# Patient Record
Sex: FEMALE | Race: BLACK OR AFRICAN AMERICAN | NOT HISPANIC OR LATINO | Employment: FULL TIME | URBAN - METROPOLITAN AREA
[De-identification: names, ages, dates, MRNs, and addresses within clinical notes are randomized per-mention and may not be internally consistent; named-entity substitution may affect disease eponyms.]

---

## 2021-06-07 PROCEDURE — 99283 EMERGENCY DEPT VISIT LOW MDM: CPT

## 2021-06-08 ENCOUNTER — APPOINTMENT (EMERGENCY)
Dept: CT IMAGING | Facility: HOSPITAL | Age: 36
End: 2021-06-08
Payer: COMMERCIAL

## 2021-06-08 ENCOUNTER — HOSPITAL ENCOUNTER (EMERGENCY)
Facility: HOSPITAL | Age: 36
Discharge: HOME | End: 2021-06-08
Attending: EMERGENCY MEDICINE
Payer: COMMERCIAL

## 2021-06-08 VITALS
TEMPERATURE: 97.7 F | BODY MASS INDEX: 24.16 KG/M2 | HEART RATE: 64 BPM | HEIGHT: 65 IN | DIASTOLIC BLOOD PRESSURE: 72 MMHG | SYSTOLIC BLOOD PRESSURE: 132 MMHG | OXYGEN SATURATION: 100 % | WEIGHT: 145 LBS | RESPIRATION RATE: 18 BRPM

## 2021-06-08 DIAGNOSIS — G44.319 ACUTE POST-TRAUMATIC HEADACHE, NOT INTRACTABLE: ICD-10-CM

## 2021-06-08 DIAGNOSIS — S09.90XA INJURY OF HEAD, INITIAL ENCOUNTER: Primary | ICD-10-CM

## 2021-06-08 PROCEDURE — 96372 THER/PROPH/DIAG INJ SC/IM: CPT

## 2021-06-08 PROCEDURE — 70450 CT HEAD/BRAIN W/O DYE: CPT

## 2021-06-08 PROCEDURE — 99283 EMERGENCY DEPT VISIT LOW MDM: CPT | Performed by: PHYSICIAN ASSISTANT

## 2021-06-08 PROCEDURE — G1004 CDSM NDSC: HCPCS

## 2021-06-08 RX ORDER — KETOROLAC TROMETHAMINE 30 MG/ML
15 INJECTION, SOLUTION INTRAMUSCULAR; INTRAVENOUS ONCE
Status: COMPLETED | OUTPATIENT
Start: 2021-06-08 | End: 2021-06-08

## 2021-06-08 RX ADMIN — KETOROLAC TROMETHAMINE 15 MG: 30 INJECTION, SOLUTION INTRAMUSCULAR at 02:19

## 2021-06-08 NOTE — ED PROVIDER NOTES
History  Chief Complaint   Patient presents with    Head Injury     Hilary Flynn in bathroom and hit head, no LOC, now complaining of headache     Patient is a 69-year-old female with no significant past medical history who presents to the emergency department for evaluation of a headache status post a fall that happened about 3 hours ago  Patient states that she walked into her bathroom, the floor was wet  The patient slipped and fell on the floor  She did hit the back of her head on the laminate floor  Patient states that she immediately developed a headache  Patient states that the headache is about an 8/10 severity  She did not lose consciousness  Patient is reporting dizziness  Patient states that she is not having any blurred vision, weakness, numbness, chest pain, difficulty breathing, abdominal pain, nausea, vomiting, diarrhea  Patient is not having any other symptoms at this time  None       History reviewed  No pertinent past medical history  History reviewed  No pertinent surgical history  History reviewed  No pertinent family history  I have reviewed and agree with the history as documented  E-Cigarette/Vaping     E-Cigarette/Vaping Substances     Social History     Tobacco Use    Smoking status: Never Smoker    Smokeless tobacco: Never Used   Substance Use Topics    Alcohol use: Not Currently    Drug use: Not Currently       Review of Systems   Constitutional: Negative for chills, diaphoresis and fever  HENT: Negative for congestion, facial swelling, nosebleeds, sore throat and voice change  Eyes: Negative for pain and redness  Respiratory: Negative for cough, choking, chest tightness, shortness of breath and stridor  Cardiovascular: Negative for chest pain and palpitations  Gastrointestinal: Negative for abdominal pain, diarrhea, nausea and vomiting     Genitourinary: Negative for difficulty urinating, dysuria, flank pain, hematuria, vaginal bleeding and vaginal discharge  Musculoskeletal: Negative for arthralgias, back pain, myalgias, neck pain and neck stiffness  Skin: Negative for color change and rash  Neurological: Positive for dizziness and headaches  Negative for syncope, facial asymmetry, weakness, light-headedness and numbness  Psychiatric/Behavioral: Negative for confusion and suicidal ideas  All other systems reviewed and are negative  Physical Exam  Physical Exam  Vitals signs reviewed  Constitutional:       General: She is not in acute distress  Appearance: Normal appearance  She is not ill-appearing, toxic-appearing or diaphoretic  HENT:      Head: Normocephalic and atraumatic  Right Ear: External ear normal       Left Ear: External ear normal       Nose: Nose normal  No congestion or rhinorrhea  Mouth/Throat:      Mouth: Mucous membranes are moist       Pharynx: Oropharynx is clear  No oropharyngeal exudate or posterior oropharyngeal erythema  Eyes:      General: No visual field deficit or scleral icterus  Right eye: No discharge  Left eye: No discharge  Extraocular Movements: Extraocular movements intact  Conjunctiva/sclera: Conjunctivae normal       Pupils: Pupils are equal, round, and reactive to light  Neck:      Musculoskeletal: Normal range of motion and neck supple  Cardiovascular:      Rate and Rhythm: Normal rate and regular rhythm  Pulses: Normal pulses  Heart sounds: Normal heart sounds  No murmur  No friction rub  No gallop  Pulmonary:      Effort: Pulmonary effort is normal  No respiratory distress  Breath sounds: Normal breath sounds  No stridor  No wheezing, rhonchi or rales  Abdominal:      General: Abdomen is flat  Palpations: Abdomen is soft  Tenderness: There is no abdominal tenderness  There is no guarding or rebound  Musculoskeletal:      Right lower leg: No edema  Left lower leg: No edema  Skin:     General: Skin is warm and dry  Capillary Refill: Capillary refill takes less than 2 seconds  Neurological:      General: No focal deficit present  Mental Status: She is alert and oriented to person, place, and time  GCS: GCS eye subscore is 4  GCS verbal subscore is 5  GCS motor subscore is 6  Cranial Nerves: Cranial nerves are intact  No cranial nerve deficit, dysarthria or facial asymmetry  Sensory: Sensation is intact  No sensory deficit  Motor: Motor function is intact  No weakness, tremor, seizure activity or pronator drift  Coordination: Coordination is intact  Romberg sign negative  Coordination normal  Finger-Nose-Finger Test and Heel to [facility] Test normal  Rapid alternating movements normal       Gait: Gait is intact  Gait normal    Psychiatric:         Mood and Affect: Mood normal          Behavior: Behavior normal          Vital Signs  ED Triage Vitals [06/07/21 2352]   Temperature Pulse Respirations Blood Pressure SpO2   97 7 °F (36 5 °C) 64 18 132/72 100 %      Temp Source Heart Rate Source Patient Position - Orthostatic VS BP Location FiO2 (%)   Temporal Monitor Sitting Left arm --      Pain Score       8           Vitals:    06/07/21 2352   BP: 132/72   Pulse: 64   Patient Position - Orthostatic VS: Sitting         Visual Acuity      ED Medications  Medications   ketorolac (TORADOL) injection 15 mg (15 mg Intramuscular Given 6/8/21 0219)       Diagnostic Studies  Results Reviewed     None                 CT head without contrast   Final Result by Estephania Najera MD (06/08 0129)      No acute intracranial abnormality  Workstation performed: [id number]                    Procedures  Procedures         ED Course                             SBIRT 22yo+      Most Recent Value   SBIRT (22 yo +)   In order to provide better care to our patients, we are screening all of our patients for alcohol and drug use  Would it be okay to ask you these screening questions?   Yes Filed at: 06/08/2021 0121 Initial Alcohol Screen: US AUDIT-C    1  How often do you have a drink containing alcohol?  0 Filed at: 06/08/2021 0121   2  How many drinks containing alcohol do you have on a typical day you are drinking? 0 Filed at: 06/08/2021 0121   3b  FEMALE Any Age, or MALE 65+: How often do you have 4 or more drinks on one occassion? 0 Filed at: 06/08/2021 0121   Audit-C Score  0 Filed at: 06/08/2021 0121   JOCELYN: How many times in the past year have you    Used an illegal drug or used a prescription medication for non-medical reasons? Never Filed at: 06/08/2021 0121                    MDM  Number of Diagnoses or Management Options  Acute post-traumatic headache, not intractable:   Injury of head, initial encounter:   Diagnosis management comments: Patient is a 25-year-old female with no significant past medical history who presents to the emergency department for evaluation of a headache status post a fall that happened about 3 hours ago  Patient states that she walked into her bathroom, the floor was wet  The patient slipped and fell on the floor  She did hit the back of her head on the laminate floor  Patient states that she immediately developed a headache  Patient states that the headache is about an 8/10 severity  She did not lose consciousness  Patient is reporting dizziness  Patient states that she is not having any blurred vision, weakness, numbness, chest pain, difficulty breathing, abdominal pain, nausea, vomiting, diarrhea  Patient is not having any other symptoms at this time  Patient appears comfortable in bed, she is not any acute distress  Her vital signs are normal   There was no hematoma or swelling noted to patient's scalp  Patient's neurologic exam was benign  Noncontrast CT of the head did not reveal any acute intracranial abnormalities  Patient was given a dose of Toradol in the emergency department    Patient was discharged home with instructions to follow-up with her primary care provider  Patient was given very strict instructions on when she should return to the emergency department  Amount and/or Complexity of Data Reviewed  Tests in the radiology section of CPT®: ordered and reviewed    Patient Progress  Patient progress: stable      Disposition  Final diagnoses:   Injury of head, initial encounter   Acute post-traumatic headache, not intractable     Time reflects when diagnosis was documented in both MDM as applicable and the Disposition within this note     Time User Action Codes Description Comment    6/8/2021  2:00 AM Colan Landau Add [S06 0X0A] Concussion without loss of consciousness, initial encounter     6/8/2021  2:00 AM Colan Landau Remove [S06 0X0A] Concussion without loss of consciousness, initial encounter     6/8/2021  2:00 AM Colan Landau Add [S09 90XA] Injury of head, initial encounter     6/8/2021  2:00 AM Colan Landau Add [A85 232] Acute post-traumatic headache, not intractable       ED Disposition     ED Disposition Condition Date/Time Comment    Discharge Stable Tue Jun 8, 2021  2:00 AM Ayde Raya discharge to home/self care  Follow-up Information     Follow up With Specialties Details Why Contact Info Additional [address] [facility] Emergency Department Emergency Medicine Go to  If symptoms worsen [facility] Emergency Department, [address] [place] [address], [facility] Jorge Segundo MD Pediatrics Schedule an appointment as soon as possible for a visit  for follow up [place] [address]  [address]  [address] [facility] [address] [phone]             There are no discharge medications for this patient  No discharge procedures on file      PDMP Review     None          ED Provider  Electronically Signed by           Varun Barraza PA-C  06/08/21 4115 Advancement-Rotation Flap Text: The defect edges were debeveled with a #15 scalpel blade.  Given the location of the defect, shape of the defect and the proximity to free margins an advancement-rotation flap was deemed most appropriate.  Using a sterile surgical marker, an appropriate flap was drawn incorporating the defect and placing the expected incisions within the relaxed skin tension lines where possible. The area thus outlined was incised deep to adipose tissue with a #15 scalpel blade.  The skin margins were undermined to an appropriate distance in all directions utilizing iris scissors.

## 2022-05-31 ENCOUNTER — APPOINTMENT (EMERGENCY)
Dept: CT IMAGING | Facility: HOSPITAL | Age: 37
End: 2022-05-31
Payer: COMMERCIAL

## 2022-05-31 ENCOUNTER — HOSPITAL ENCOUNTER (OUTPATIENT)
Facility: HOSPITAL | Age: 37
Setting detail: OBSERVATION
Discharge: HOME | End: 2022-06-01
Attending: EMERGENCY MEDICINE | Admitting: INTERNAL MEDICINE
Payer: COMMERCIAL

## 2022-05-31 DIAGNOSIS — H47.10 PAPILLEDEMA: Primary | ICD-10-CM

## 2022-05-31 PROBLEM — F41.9 ANXIETY: Status: ACTIVE | Noted: 2022-05-31

## 2022-05-31 PROBLEM — F32.A DEPRESSION: Status: ACTIVE | Noted: 2022-05-31

## 2022-05-31 LAB
ALBUMIN SERPL BCP-MCNC: 3.7 G/DL (ref 3.5–5)
ALP SERPL-CCNC: 98 U/L (ref 46–116)
ALT SERPL W P-5'-P-CCNC: 28 U/L (ref 12–78)
ANION GAP SERPL CALCULATED.3IONS-SCNC: 7 MMOL/L (ref 4–13)
APTT PPP: 32 SECONDS (ref 23–37)
AST SERPL W P-5'-P-CCNC: 22 U/L (ref 5–45)
BASOPHILS # BLD AUTO: 0.03 THOUSANDS/ΜL (ref 0–0.1)
BASOPHILS NFR BLD AUTO: 1 % (ref 0–1)
BILIRUB SERPL-MCNC: 0.24 MG/DL (ref 0.2–1)
BUN SERPL-MCNC: 15 MG/DL (ref 5–25)
CALCIUM SERPL-MCNC: 8.7 MG/DL (ref 8.3–10.1)
CHLORIDE SERPL-SCNC: 103 MMOL/L (ref 100–108)
CO2 SERPL-SCNC: 27 MMOL/L (ref 21–32)
CREAT SERPL-MCNC: 0.82 MG/DL (ref 0.6–1.3)
EOSINOPHIL # BLD AUTO: 0.12 THOUSAND/ΜL (ref 0–0.61)
EOSINOPHIL NFR BLD AUTO: 3 % (ref 0–6)
ERYTHROCYTE [DISTWIDTH] IN BLOOD BY AUTOMATED COUNT: 14.5 % (ref 11.6–15.1)
EXT PREG TEST URINE: NEGATIVE
EXT. CONTROL ED NAV: NORMAL
GFR SERPL CREATININE-BSD FRML MDRD: 91 ML/MIN/1.73SQ M
GLUCOSE SERPL-MCNC: 87 MG/DL (ref 65–140)
HCG SERPL QL: NEGATIVE
HCT VFR BLD AUTO: 35.6 % (ref 34.8–46.1)
HGB BLD-MCNC: 10.9 G/DL (ref 11.5–15.4)
IMM GRANULOCYTES # BLD AUTO: 0.01 THOUSAND/UL (ref 0–0.2)
IMM GRANULOCYTES NFR BLD AUTO: 0 % (ref 0–2)
INR PPP: 1.13 (ref 0.84–1.19)
LYMPHOCYTES # BLD AUTO: 1.8 THOUSANDS/ΜL (ref 0.6–4.47)
LYMPHOCYTES NFR BLD AUTO: 43 % (ref 14–44)
MCH RBC QN AUTO: 28 PG (ref 26.8–34.3)
MCHC RBC AUTO-ENTMCNC: 30.6 G/DL (ref 31.4–37.4)
MCV RBC AUTO: 92 FL (ref 82–98)
MONOCYTES # BLD AUTO: 0.35 THOUSAND/ΜL (ref 0.17–1.22)
MONOCYTES NFR BLD AUTO: 8 % (ref 4–12)
NEUTROPHILS # BLD AUTO: 1.84 THOUSANDS/ΜL (ref 1.85–7.62)
NEUTS SEG NFR BLD AUTO: 45 % (ref 43–75)
NRBC BLD AUTO-RTO: 0 /100 WBCS
PLATELET # BLD AUTO: 296 THOUSANDS/UL (ref 149–390)
PMV BLD AUTO: 11.1 FL (ref 8.9–12.7)
POTASSIUM SERPL-SCNC: 3.6 MMOL/L (ref 3.5–5.3)
PROT SERPL-MCNC: 7.6 G/DL (ref 6.4–8.2)
PROTHROMBIN TIME: 14 SECONDS (ref 11.6–14.5)
RBC # BLD AUTO: 3.89 MILLION/UL (ref 3.81–5.12)
SODIUM SERPL-SCNC: 137 MMOL/L (ref 136–145)
WBC # BLD AUTO: 4.15 THOUSAND/UL (ref 4.31–10.16)

## 2022-05-31 PROCEDURE — 99220 PR INITIAL OBSERVATION CARE/DAY 70 MINUTES: CPT | Performed by: INTERNAL MEDICINE

## 2022-05-31 PROCEDURE — 85730 THROMBOPLASTIN TIME PARTIAL: CPT | Performed by: PHYSICIAN ASSISTANT

## 2022-05-31 PROCEDURE — 85610 PROTHROMBIN TIME: CPT | Performed by: PHYSICIAN ASSISTANT

## 2022-05-31 PROCEDURE — 36415 COLL VENOUS BLD VENIPUNCTURE: CPT | Performed by: PHYSICIAN ASSISTANT

## 2022-05-31 PROCEDURE — 81025 URINE PREGNANCY TEST: CPT | Performed by: PHYSICIAN ASSISTANT

## 2022-05-31 PROCEDURE — 80053 COMPREHEN METABOLIC PANEL: CPT | Performed by: PHYSICIAN ASSISTANT

## 2022-05-31 PROCEDURE — 85025 COMPLETE CBC W/AUTO DIFF WBC: CPT | Performed by: PHYSICIAN ASSISTANT

## 2022-05-31 PROCEDURE — 84703 CHORIONIC GONADOTROPIN ASSAY: CPT | Performed by: PHYSICIAN ASSISTANT

## 2022-05-31 PROCEDURE — 99284 EMERGENCY DEPT VISIT MOD MDM: CPT

## 2022-05-31 PROCEDURE — 70450 CT HEAD/BRAIN W/O DYE: CPT

## 2022-05-31 PROCEDURE — 99285 EMERGENCY DEPT VISIT HI MDM: CPT | Performed by: PHYSICIAN ASSISTANT

## 2022-05-31 RX ORDER — LORAZEPAM 2 MG/ML
1 INJECTION INTRAMUSCULAR ONCE AS NEEDED
Status: DISCONTINUED | OUTPATIENT
Start: 2022-05-31 | End: 2022-06-01 | Stop reason: HOSPADM

## 2022-05-31 RX ORDER — ACETAMINOPHEN 325 MG/1
650 TABLET ORAL EVERY 6 HOURS PRN
Status: DISCONTINUED | OUTPATIENT
Start: 2022-05-31 | End: 2022-06-01 | Stop reason: HOSPADM

## 2022-06-01 ENCOUNTER — APPOINTMENT (OUTPATIENT)
Dept: NON INVASIVE DIAGNOSTICS | Facility: HOSPITAL | Age: 37
End: 2022-06-01
Attending: INTERNAL MEDICINE
Payer: COMMERCIAL

## 2022-06-01 VITALS
WEIGHT: 200 LBS | SYSTOLIC BLOOD PRESSURE: 140 MMHG | RESPIRATION RATE: 22 BRPM | HEART RATE: 68 BPM | TEMPERATURE: 98.9 F | DIASTOLIC BLOOD PRESSURE: 76 MMHG | OXYGEN SATURATION: 100 % | BODY MASS INDEX: 29.62 KG/M2 | HEIGHT: 69 IN

## 2022-06-01 LAB
ALBUMIN SERPL BCP-MCNC: 3.2 G/DL (ref 3.5–5)
ALP SERPL-CCNC: 86 U/L (ref 46–116)
ALT SERPL W P-5'-P-CCNC: 26 U/L (ref 12–78)
ANION GAP SERPL CALCULATED.3IONS-SCNC: 8 MMOL/L (ref 4–13)
APPEARANCE CSF: CLEAR
AST SERPL W P-5'-P-CCNC: 21 U/L (ref 5–45)
BILIRUB SERPL-MCNC: 0.28 MG/DL (ref 0.2–1)
BUN SERPL-MCNC: 12 MG/DL (ref 5–25)
CALCIUM ALBUM COR SERPL-MCNC: 9 MG/DL (ref 8.3–10.1)
CALCIUM SERPL-MCNC: 8.4 MG/DL (ref 8.3–10.1)
CHLORIDE SERPL-SCNC: 105 MMOL/L (ref 100–108)
CO2 SERPL-SCNC: 25 MMOL/L (ref 21–32)
CREAT SERPL-MCNC: 0.76 MG/DL (ref 0.6–1.3)
ERYTHROCYTE [DISTWIDTH] IN BLOOD BY AUTOMATED COUNT: 14.2 % (ref 11.6–15.1)
GFR SERPL CREATININE-BSD FRML MDRD: 99 ML/MIN/1.73SQ M
GLUCOSE CSF-MCNC: 62 MG/DL (ref 50–80)
GLUCOSE P FAST SERPL-MCNC: 88 MG/DL (ref 65–99)
GLUCOSE SERPL-MCNC: 88 MG/DL (ref 65–140)
GRAM STN SPEC: NORMAL
HCT VFR BLD AUTO: 34.1 % (ref 34.8–46.1)
HGB BLD-MCNC: 10.6 G/DL (ref 11.5–15.4)
MCH RBC QN AUTO: 27.9 PG (ref 26.8–34.3)
MCHC RBC AUTO-ENTMCNC: 31.1 G/DL (ref 31.4–37.4)
MCV RBC AUTO: 90 FL (ref 82–98)
PLATELET # BLD AUTO: 269 THOUSANDS/UL (ref 149–390)
PMV BLD AUTO: 10.9 FL (ref 8.9–12.7)
POTASSIUM SERPL-SCNC: 3.3 MMOL/L (ref 3.5–5.3)
PROT CSF-MCNC: 37 MG/DL (ref 15–45)
PROT SERPL-MCNC: 6.9 G/DL (ref 6.4–8.2)
RBC # BLD AUTO: 3.8 MILLION/UL (ref 3.81–5.12)
RBC # CSF MANUAL: 0 UL (ref 0–10)
SODIUM SERPL-SCNC: 138 MMOL/L (ref 136–145)
TOTAL CELLS COUNTED BLD: NO
WBC # BLD AUTO: 4.02 THOUSAND/UL (ref 4.31–10.16)
WBC # CSF AUTO: 0 /UL (ref 0–5)

## 2022-06-01 PROCEDURE — 62328 DX LMBR SPI PNXR W/FLUOR/CT: CPT | Performed by: RADIOLOGY

## 2022-06-01 PROCEDURE — 82945 GLUCOSE OTHER FLUID: CPT | Performed by: INTERNAL MEDICINE

## 2022-06-01 PROCEDURE — 62328 DX LMBR SPI PNXR W/FLUOR/CT: CPT

## 2022-06-01 PROCEDURE — 85027 COMPLETE CBC AUTOMATED: CPT | Performed by: INTERNAL MEDICINE

## 2022-06-01 PROCEDURE — 36415 COLL VENOUS BLD VENIPUNCTURE: CPT | Performed by: INTERNAL MEDICINE

## 2022-06-01 PROCEDURE — 99217 PR OBSERVATION CARE DISCHARGE MANAGEMENT: CPT | Performed by: INTERNAL MEDICINE

## 2022-06-01 PROCEDURE — 89051 BODY FLUID CELL COUNT: CPT | Performed by: INTERNAL MEDICINE

## 2022-06-01 PROCEDURE — 84157 ASSAY OF PROTEIN OTHER: CPT | Performed by: INTERNAL MEDICINE

## 2022-06-01 PROCEDURE — 89050 BODY FLUID CELL COUNT: CPT | Performed by: INTERNAL MEDICINE

## 2022-06-01 PROCEDURE — 80053 COMPREHEN METABOLIC PANEL: CPT | Performed by: INTERNAL MEDICINE

## 2022-06-01 PROCEDURE — 99283 EMERGENCY DEPT VISIT LOW MDM: CPT | Performed by: PSYCHIATRY & NEUROLOGY

## 2022-06-01 PROCEDURE — 87070 CULTURE OTHR SPECIMN AEROBIC: CPT | Performed by: INTERNAL MEDICINE

## 2022-06-01 RX ORDER — SODIUM CHLORIDE 9 MG/ML
125 INJECTION, SOLUTION INTRAVENOUS CONTINUOUS
Status: DISCONTINUED | OUTPATIENT
Start: 2022-06-01 | End: 2022-06-01 | Stop reason: HOSPADM

## 2022-06-01 RX ORDER — ACETAZOLAMIDE 250 MG/1
250 TABLET ORAL EVERY 12 HOURS SCHEDULED
Status: DISCONTINUED | OUTPATIENT
Start: 2022-06-01 | End: 2022-06-01 | Stop reason: HOSPADM

## 2022-06-01 RX ORDER — LIDOCAINE WITH 8.4% SOD BICARB 0.9%(10ML)
SYRINGE (ML) INJECTION CODE/TRAUMA/SEDATION MEDICATION
Status: COMPLETED | OUTPATIENT
Start: 2022-06-01 | End: 2022-06-01

## 2022-06-01 RX ORDER — POTASSIUM CHLORIDE 20 MEQ/1
20 TABLET, EXTENDED RELEASE ORAL ONCE
Status: COMPLETED | OUTPATIENT
Start: 2022-06-01 | End: 2022-06-01

## 2022-06-01 RX ORDER — ACETAZOLAMIDE 250 MG/1
TABLET ORAL
Qty: 114 TABLET | Refills: 0 | Status: SHIPPED | OUTPATIENT
Start: 2022-06-01 | End: 2022-07-01

## 2022-06-01 RX ADMIN — POTASSIUM CHLORIDE 20 MEQ: 1500 TABLET, EXTENDED RELEASE ORAL at 09:16

## 2022-06-01 RX ADMIN — ACETAMINOPHEN 650 MG: 325 TABLET, FILM COATED ORAL at 10:05

## 2022-06-01 RX ADMIN — Medication 4 ML: at 12:20

## 2022-06-01 RX ADMIN — SODIUM CHLORIDE 125 ML/HR: 0.9 INJECTION, SOLUTION INTRAVENOUS at 11:39

## 2022-06-01 RX ADMIN — ACETAZOLAMIDE 250 MG: 250 TABLET ORAL at 16:19

## 2022-06-01 RX ADMIN — SERTRALINE HYDROCHLORIDE 50 MG: 50 TABLET ORAL at 09:15

## 2022-06-01 NOTE — ED NOTES
Pt provided crackers and juice, no distress noted at this time        Wily Dawson, ANA  06/01/22 6766

## 2022-06-01 NOTE — PLAN OF CARE
Problem: PAIN - ADULT  Goal: Verbalizes/displays adequate comfort level or baseline comfort level  Description: Interventions:  - Encourage patient to monitor pain and request assistance  - Assess pain using appropriate pain scale  - Administer analgesics based on type and severity of pain and evaluate response  - Implement non-pharmacological measures as appropriate and evaluate response  - Consider cultural and social influences on pain and pain management  - Notify physician/advanced practitioner if interventions unsuccessful or patient reports new pain  Outcome: Progressing     Problem: INFECTION - ADULT  Goal: Absence or prevention of progression during hospitalization  Description: INTERVENTIONS:  - Assess and monitor for signs and symptoms of infection  - Monitor lab/diagnostic results  - Monitor all insertion sites, i e  indwelling lines, tubes, and drains  - Monitor endotracheal if appropriate and nasal secretions for changes in amount and color  - [place] appropriate cooling/warming therapies per order  - Administer medications as ordered  - Instruct and encourage patient and family to use good hand hygiene technique  - Identify and instruct in appropriate isolation precautions for identified infection/condition  Outcome: Progressing  Goal: Absence of fever/infection during neutropenic period  Description: INTERVENTIONS:  - Monitor WBC    Outcome: Progressing     Problem: SAFETY ADULT  Goal: Patient will remain free of falls  Description: INTERVENTIONS:  - Educate patient/family on patient safety including physical limitations  - Instruct patient to call for assistance with activity   - Consult OT/PT to assist with strengthening/mobility   - Keep Call bell within reach  - Keep bed low and locked with side rails adjusted as appropriate  - Keep care items and personal belongings within reach  - Initiate and maintain comfort rounds  - Make Fall Risk Sign visible to staff  - Offer Toileting every 2 Hours, in advance of need  - Initiate/Maintain bed alarm  - Obtain necessary fall risk management equipment:   - Apply yellow socks and bracelet for high fall risk patients  - Consider moving patient to room near nurses station  Outcome: Progressing  Goal: Maintain or return to baseline ADL function  Description: INTERVENTIONS:  -  Assess patient's ability to carry out ADLs; assess patient's baseline for ADL function and identify physical deficits which impact ability to perform ADLs (bathing, care of mouth/teeth, toileting, grooming, dressing, etc )  - Assess/evaluate cause of self-care deficits   - Assess range of motion  - Assess patient's mobility; develop plan if impaired  - Assess patient's need for assistive devices and provide as appropriate  - Encourage maximum independence but intervene and supervise when necessary  - Involve family in performance of ADLs  - Assess for home care needs following discharge   - Consider OT consult to assist with ADL evaluation and planning for discharge  - Provide patient education as appropriate  Outcome: Progressing  Goal: Maintains/Returns to pre admission functional level  Description: INTERVENTIONS:  - Perform BMAT or MOVE assessment daily    - Set and communicate daily mobility goal to care team and patient/family/caregiver  - Collaborate with rehabilitation services on mobility goals if consulted  - Perform Range of Motion 2 times a day  - Reposition patient every 2 hours    - Dangle patient 2 times a day  - Stand patient 2 times a day  - Ambulate patient 2 times a day  - Out of bed to chair 2 times a day   - Out of bed for meals 2 times a day  - Out of bed for toileting  - Record patient progress and toleration of activity level   Outcome: Progressing     Problem: DISCHARGE PLANNING  Goal: Discharge to home or other facility with appropriate resources  Description: INTERVENTIONS:  - Identify barriers to discharge w/patient and caregiver  - Arrange for needed discharge resources and transportation as appropriate  - Identify discharge learning needs (meds, wound care, etc )  - Arrange for interpretive services to assist at discharge as needed  - Refer to Case Management Department for coordinating discharge planning if the patient needs post-hospital services based on physician/advanced practitioner order or complex needs related to functional status, cognitive ability, or social support system  Outcome: Progressing     Problem: Knowledge Deficit  Goal: Patient/family/caregiver demonstrates understanding of disease process, treatment plan, medications, and discharge instructions  Description: Complete learning assessment and assess knowledge base    Interventions:  - Provide teaching at level of understanding  - Provide teaching via preferred learning methods  Outcome: Progressing

## 2022-06-01 NOTE — NURSING NOTE
Patient resting in bed with no needs or complaints  Patient provided with a snack per request  Vital signs WNL  Call Karissa Sic in reach

## 2022-06-01 NOTE — ASSESSMENT & PLAN NOTE
Patient reports the ED after an ophthalmologist appointment today  Patient reports that she has been having blurred vision and intermittent headaches over the last 2 weeks  During her ophthalmology appointment there was found to be bilateral papilledema on examination and she was referred to the ED for further workup  · No other neuro deficits such as dizziness, slurred speech, facial droop, weakness, numbness/tingling, ataxia    · CT head:  No acute intracranial abnormality  · Obtain MRI  · Consult IR for LP in the morning to evaluate for idiopathic intracranial hypertension  · Neuro checks  · Consult to Neurology, recommendations are appreciated

## 2022-06-01 NOTE — BRIEF OP NOTE (RAD/CATH)
INTERVENTIONAL RADIOLOGY PROCEDURE NOTE    Date: 6/1/2022    Procedure: lumbar puncture    Preoperative diagnosis:   1  Papilledema         Postoperative diagnosis: Same  Surgeon: Don Espinoza DO     Assistant: None  No qualified resident was available  Blood loss: minimal    Specimens: 28 cc clear csf     Findings: Opening pressure 29; closing pressure 12    Complications: None immediate      Anesthesia: local

## 2022-06-01 NOTE — ASSESSMENT & PLAN NOTE
37F with no significant medical history who was referred to the ED 5/31/2022 by her ophthalmologist secondary to exam findings concerning for papilledema, increased occular pressures and inflammed optic nerve  The patient reports that she has been experiencing  intermittent headaches since having Covid-19 12/2021  She describes her headaches as variable; sometimes bilateral temporal area but more often sinus pressure  She tried prn tylenol and motrin with some relief  She states that in addition to her headaches, she felt dizzy when quickly moving to an upright position, blurred vision , photophobia, and was seeing colored spots x 2 weeks  She also reported intermittent numbness and tingling in her bilateral fingers which is not new  Initial /86  LP 6/1/2022 with OP 29, CP 12 consistent with idiopathic intracranial hypertension    Imaging:  [facility] 5/31:  IMPRESSION:  No acute intracranial abnormality      Pertinent labs:  CSF studies pending    Recommendations:  - no need for MRI  - start diamox 250mg bid for 3 days then increase to 500mg bid   - outpatient neurology follow-up in 4-6 weeks

## 2022-06-01 NOTE — H&P
[address] [facility]  H&P- Hemanth López 1985, 44 y o  female MRN: [id number]  Unit/Bed#: ED 15 Encounter: [id number]  Primary Care Provider: Gris Arnold MD   Date and time admitted to hospital: 5/31/2022  7:17 PM    * Papilledema  Assessment & Plan  Patient reports the ED after an ophthalmologist appointment today  Patient reports that she has been having blurred vision and intermittent headaches over the last 2 weeks  During her ophthalmology appointment there was found to be bilateral papilledema on examination and she was referred to the ED for further workup  · No other neuro deficits such as dizziness, slurred speech, facial droop, weakness, numbness/tingling, ataxia  · CT head:  No acute intracranial abnormality  · Obtain MRI  · Consult IR for LP in the morning to evaluate for idiopathic intracranial hypertension  · Neuro checks  · Consult to Neurology, recommendations are appreciated    Anxiety  Assessment & Plan  · Mood stable  · Continue zoloft      VTE Prophylaxis: Pharmacologic VTE Prophylaxis contraindicated due to low risk  / sequential compression device   Code Status: Level 1 code  Discussion with family: All patient questions answered to the best of my ability    Anticipated Length of Stay:  Patient will be admitted on an Observation basis with an anticipated length of stay of  Less than 2 midnights  Justification for Hospital Stay: Papilledema    Total Time for Visit, including Counseling / Coordination of Care: 60 minutes  Greater than 50% of this total time spent on direct patient counseling and coordination of care  Chief Complaint:   Papilledema    History of Present Illness:    Hemanth López is a 44 y o  female who has a past medical history significant for anxiety  Patient reports the ED after an ophthalmologist appointment today  Patient reports that she has been having blurred vision and intermittent headaches over the last 2 weeks    During her ophthalmology appointment there was found to be bilateral papilledema on examination and she was referred to the ED for further workup  No neurologic deficits seen on exam   Patient requiring medical admission for MRI and lumbar puncture with Neurology consultation  Review of Systems:    Review of Systems   Constitutional: Negative for chills and fever  HENT: Negative for congestion, ear pain, rhinorrhea and sore throat  Eyes: Positive for visual disturbance  Negative for pain  Respiratory: Negative for cough and shortness of breath  Cardiovascular: Negative for chest pain and palpitations  Gastrointestinal: Negative for abdominal pain, constipation, diarrhea, nausea and vomiting  Genitourinary: Negative for dysuria, hematuria and urgency  Musculoskeletal: Negative for arthralgias, back pain and myalgias  Skin: Negative for color change and rash  Neurological: Positive for headaches  Negative for dizziness, seizures, syncope and speech difficulty  All other systems reviewed and are negative  Past Medical and Surgical History:     History reviewed  No pertinent past medical history  History reviewed  No pertinent surgical history  Meds/Allergies:    Prior to Admission medications    Medication Sig Start Date End Date Taking? Authorizing Provider   sertraline (ZOLOFT) 50 mg tablet Take 50 mg by mouth daily   Yes Historical Provider, MD     I have reviewed home medications using allscripts      Allergies: No Known Allergies    Social History:     Marital Status: /Civil Union   Occupation: NA  Patient Pre-hospital Living Situation: Home  Patient Pre-hospital Level of Mobility: Independent  Patient Pre-hospital Diet Restrictions: None  Substance Use History:   Social History     Substance and Sexual Activity   Alcohol Use Not Currently     Social History     Tobacco Use   Smoking Status Never Smoker   Smokeless Tobacco Never Used     Social History     Substance and Sexual Activity   Drug Use Not Currently       Family History:    History reviewed  No pertinent family history  Physical Exam:     Vitals:   Blood Pressure: 166/87 (05/31/22 2318)  Pulse: 66 (05/31/22 2318)  Temperature: 98 9 °F (37 2 °C) (05/31/22 1859)  Respirations: 16 (05/31/22 2318)  Height: 5' 9" (175 3 cm) (05/31/22 1859)  Weight - Scale: 90 7 kg (200 lb) (05/31/22 1859)  SpO2: 100 % (05/31/22 2318)    Physical Exam  Vitals and nursing note reviewed  Constitutional:       General: She is not in acute distress  Appearance: She is well-developed  HENT:      Head: Normocephalic and atraumatic  Nose: No congestion or rhinorrhea  Mouth/Throat:      Pharynx: Oropharynx is clear  Eyes:      General: No scleral icterus  Conjunctiva/sclera: Conjunctivae normal    Cardiovascular:      Rate and Rhythm: Normal rate and regular rhythm  Pulses: Normal pulses  Heart sounds: No murmur heard  Pulmonary:      Effort: Pulmonary effort is normal  No respiratory distress  Breath sounds: Normal breath sounds  Abdominal:      General: Bowel sounds are normal  There is no distension  Palpations: Abdomen is soft  Tenderness: There is no abdominal tenderness  Musculoskeletal:         General: Normal range of motion  Cervical back: Neck supple  Right lower leg: No edema  Left lower leg: No edema  Skin:     General: Skin is warm and dry  Neurological:      Mental Status: She is alert and oriented to person, place, and time  Additional Data:     Lab Results: I have personally reviewed pertinent reports        Results from last 7 days   Lab Units 05/31/22 2040   WBC Thousand/uL 4 15*   HEMOGLOBIN g/dL 10 9*   HEMATOCRIT % 35 6   PLATELETS Thousands/uL 296   NEUTROS PCT % 45   LYMPHS PCT % 43   MONOS PCT % 8   EOS PCT % 3     Results from last 7 days   Lab Units 05/31/22 2040   SODIUM mmol/L 137   POTASSIUM mmol/L 3 6   CHLORIDE mmol/L 103   CO2 mmol/L 27   BUN mg/dL 15   CREATININE mg/dL 0 82   ANION GAP mmol/L 7   CALCIUM mg/dL 8 7   ALBUMIN g/dL 3 7   TOTAL BILIRUBIN mg/dL 0 24   ALK PHOS U/L 98   ALT U/L 28   AST U/L 22   GLUCOSE RANDOM mg/dL 87     Results from last 7 days   Lab Units 05/31/22 2040   INR  1 13                   Imaging: I have personally reviewed pertinent reports  CT head without contrast   Final Result by Jerrold Cockayne, MD (05/31 2156)      No acute intracranial abnormality  Workstation performed: [id number]         MRI inpatient order    (Results Pending)       EKG, Pathology, and Other Studies Reviewed on Admission:   · EKG: None obtained  · CT head: Reviewed    Allscripts / Epic Records Reviewed: Yes     ** Please Note: This note has been constructed using a voice recognition system   **

## 2022-06-01 NOTE — ED PROVIDER NOTES
History  Chief Complaint   Patient presents with    Eye Problem     Has been having headaches, seen at eye dr Charu Jordan and was told she had "elevated pressure behind both eyes" referred to ED for eval      43 yo with headache  Onset about week ago  Has been having increasing blurred vision since onset  Intermittent  Seen by ophtho today and was told she had bilateral papilledema and referred to ED  She has no h/o similar  Denies extremity numbness, tingling, weakness  No trauma  No h/o malignancy  History provided by:  Patient   used: No    Headache - New Onset or New Symptoms  Pain location:  Generalized  Radiates to:  Does not radiate  Severity currently:  7/10  Severity at highest:  8/10  Onset quality:  Gradual  Duration:  1 week  Timing:  Intermittent  Progression:  Waxing and waning  Chronicity:  New  Similar to prior headaches: no    Relieved by:  Nothing  Worsened by:  Nothing  Associated symptoms: no abdominal pain, no back pain, no cough, no ear pain, no eye pain, no fever, no seizures, no sore throat and no vomiting        Prior to Admission Medications   Prescriptions Last Dose Informant Patient Reported? Taking?   sertraline (ZOLOFT) 50 mg tablet Past Week at Unknown time  Yes Yes   Sig: Take 50 mg by mouth daily      Facility-Administered Medications: None       History reviewed  No pertinent past medical history  Past Surgical History:   Procedure Laterality Date    IR LUMBAR PUNCTURE  6/1/2022       History reviewed  No pertinent family history  I have reviewed and agree with the history as documented  E-Cigarette/Vaping     E-Cigarette/Vaping Substances     Social History     Tobacco Use    Smoking status: Never Smoker    Smokeless tobacco: Never Used   Substance Use Topics    Alcohol use: Not Currently    Drug use: Not Currently       Review of Systems   Constitutional: Negative for chills and fever  HENT: Negative for ear pain and sore throat      Eyes: Positive for visual disturbance  Negative for pain  Respiratory: Negative for cough and shortness of breath  Cardiovascular: Negative for chest pain and palpitations  Gastrointestinal: Negative for abdominal pain and vomiting  Genitourinary: Negative for dysuria and hematuria  Musculoskeletal: Negative for arthralgias and back pain  Skin: Negative for color change and rash  Neurological: Positive for headaches  Negative for seizures and syncope  All other systems reviewed and are negative  Physical Exam  Physical Exam  Vitals and nursing note reviewed  Constitutional:       General: She is not in acute distress  Appearance: She is well-developed  HENT:      Head: Normocephalic and atraumatic  Eyes:      Conjunctiva/sclera: Conjunctivae normal    Cardiovascular:      Rate and Rhythm: Normal rate and regular rhythm  Heart sounds: No murmur heard  Pulmonary:      Effort: Pulmonary effort is normal  No respiratory distress  Breath sounds: Normal breath sounds  Abdominal:      Palpations: Abdomen is soft  Tenderness: There is no abdominal tenderness  Musculoskeletal:      Cervical back: Neck supple  Skin:     General: Skin is warm and dry  Neurological:      Mental Status: She is alert           Vital Signs  ED Triage Vitals   Temperature Pulse Respirations Blood Pressure SpO2   05/31/22 1859 05/31/22 1859 05/31/22 1859 05/31/22 1859 05/31/22 1859   98 9 °F (37 2 °C) 63 18 143/86 100 %      Temp src Heart Rate Source Patient Position - Orthostatic VS BP Location FiO2 (%)   -- -- 05/31/22 2210 05/31/22 2210 --     Sitting Right arm       Pain Score       05/31/22 2318       No Pain           Vitals:    05/31/22 2318 06/01/22 0300 06/01/22 0800 06/01/22 1214   BP: 166/87  138/71 140/76   Pulse: 66 83 68 68   Patient Position - Orthostatic VS:             Visual Acuity  Visual Acuity    Flowsheet Row Most Recent Value   Visual acuity R eye is 20/30   Visual acuity Left eye is 20/25   Visual acuity in both eyes is 20/25   Wearing corrective eyewear/lenses?  Yes          ED Medications  Medications   potassium chloride (K-DUR,KLOR-CON) CR tablet 20 mEq (20 mEq Oral Given 6/1/22 0916)   lidocaine 1% buffered (4 mL Infiltration Given 6/1/22 1220)       Diagnostic Studies  Results Reviewed     Procedure Component Value Units Date/Time    CSF culture and Gram stain [[id number]] Collected: 06/01/22 1243    Lab Status: Final result Specimen: Cerebrospinal Fluid from Lumbar Puncture Updated: 06/04/22 0750     CSF Culture No growth    CSF white cell count with differential [[id number]] Collected: 06/01/22 1229    Lab Status: Final result Specimen: Cerebrospinal Fluid from Lumbar Puncture Updated: 06/01/22 1339     Appearance, CSF clear     WBC, CSF 0 /uL      Xanthochromia No    RBC count,CSF [[id number]]  (Normal) Collected: 06/01/22 1229    Lab Status: Final result Specimen: Cerebrospinal Fluid from Lumbar Puncture Updated: 06/01/22 1338     RBC, CSF 0 uL     Gram stain CSF [[id number]] Collected: 06/01/22 1229    Lab Status: Final result Specimen: Cerebrospinal Fluid from Lumbar Puncture Updated: 06/01/22 1323     Gram Stain Result No Polys or Bacteria seen    Glucose CSF [[id number]]  (Normal) Collected: 06/01/22 1229    Lab Status: Final result Specimen: Cerebrospinal Fluid from Lumbar Puncture Updated: 06/01/22 1301     Glucose, CSF 62 mg/dL     Protein CSF [[id number]]  (Normal) Collected: 06/01/22 1229    Lab Status: Final result Specimen: Cerebrospinal Fluid from Lumbar Puncture Updated: 06/01/22 1301     Protein, CSF 37 mg/dL     Comprehensive metabolic panel [[id number]]  (Abnormal) Collected: 06/01/22 0500    Lab Status: Final result Specimen: Blood from Arm, Left Updated: 06/01/22 0532     Sodium 138 mmol/L      Potassium 3 3 mmol/L      Chloride 105 mmol/L      CO2 25 mmol/L      ANION GAP 8 mmol/L      BUN 12 mg/dL      Creatinine 0 76 mg/dL      Glucose 88 mg/dL      Glucose, Fasting 88 mg/dL      Calcium 8 4 mg/dL      Corrected Calcium 9 0 mg/dL      AST 21 U/L      ALT 26 U/L      Alkaline Phosphatase 86 U/L      Total Protein 6 9 g/dL      Albumin 3 2 g/dL      Total Bilirubin 0 28 mg/dL      eGFR 99 ml/min/1 73sq m     Narrative:      [place] guidelines for Chronic Kidney Disease (CKD):     Stage 1 with normal or high GFR (GFR > 90 mL/min/1 73 square meters)    Stage 2 Mild CKD (GFR = 60-89 mL/min/1 73 square meters)    Stage 3A Moderate CKD (GFR = 45-59 mL/min/1 73 square meters)    Stage 3B Moderate CKD (GFR = 30-44 mL/min/1 73 square meters)    Stage 4 Severe CKD (GFR = 15-29 mL/min/1 73 square meters)    Stage 5 End Stage CKD (GFR <15 mL/min/1 73 square meters)  Note: GFR calculation is accurate only with a steady state creatinine    CBC (With Platelets) [[id number]]  (Abnormal) Collected: 06/01/22 0500    Lab Status: Final result Specimen: Blood from Arm, Left Updated: 06/01/22 0512     WBC 4 02 Thousand/uL      RBC 3 80 Million/uL      Hemoglobin 10 6 g/dL      Hematocrit 34 1 %      MCV 90 fL      MCH 27 9 pg      MCHC 31 1 g/dL      RDW 14 2 %      Platelets 177 Thousands/uL      MPV 10 9 fL     hCG, qualitative pregnancy [[id number]]  (Normal) Collected: 05/31/22 2040    Lab Status: Final result Specimen: Blood from Arm, Right Updated: 05/31/22 2115     Preg, Serum Negative    Comprehensive metabolic panel [[id number]] Collected: 05/31/22 2040    Lab Status: Final result Specimen: Blood from Arm, Right Updated: 05/31/22 2107     Sodium 137 mmol/L      Potassium 3 6 mmol/L      Chloride 103 mmol/L      CO2 27 mmol/L      ANION GAP 7 mmol/L      BUN 15 mg/dL      Creatinine 0 82 mg/dL      Glucose 87 mg/dL      Calcium 8 7 mg/dL      AST 22 U/L      ALT 28 U/L      Alkaline Phosphatase 98 U/L      Total Protein 7 6 g/dL      Albumin 3 7 g/dL      Total Bilirubin 0 24 mg/dL      eGFR 91 ml/min/1 73sq m     Narrative:      [employer] guidelines for Chronic Kidney Disease (CKD):     Stage 1 with normal or high GFR (GFR > 90 mL/min/1 73 square meters)    Stage 2 Mild CKD (GFR = 60-89 mL/min/1 73 square meters)    Stage 3A Moderate CKD (GFR = 45-59 mL/min/1 73 square meters)    Stage 3B Moderate CKD (GFR = 30-44 mL/min/1 73 square meters)    Stage 4 Severe CKD (GFR = 15-29 mL/min/1 73 square meters)    Stage 5 End Stage CKD (GFR <15 mL/min/1 73 square meters)  Note: GFR calculation is accurate only with a steady state creatinine    Protime-INR [[id number]]  (Normal) Collected: 05/31/22 2040    Lab Status: Final result Specimen: Blood from Arm, Right Updated: 05/31/22 2059     Protime 14 0 seconds      INR 1 13    APTT [[id number]]  (Normal) Collected: 05/31/22 2040    Lab Status: Final result Specimen: Blood from Arm, Right Updated: 05/31/22 2059     PTT 32 seconds     CBC and differential [[id number]]  (Abnormal) Collected: 05/31/22 2040    Lab Status: Final result Specimen: Blood from Arm, Right Updated: 05/31/22 2046     WBC 4 15 Thousand/uL      RBC 3 89 Million/uL      Hemoglobin 10 9 g/dL      Hematocrit 35 6 %      MCV 92 fL      MCH 28 0 pg      MCHC 30 6 g/dL      RDW 14 5 %      MPV 11 1 fL      Platelets 157 Thousands/uL      nRBC 0 /100 WBCs      Neutrophils Relative 45 %      Immat GRANS % 0 %      Lymphocytes Relative 43 %      Monocytes Relative 8 %      Eosinophils Relative 3 %      Basophils Relative 1 %      Neutrophils Absolute 1 84 Thousands/µL      Immature Grans Absolute 0 01 Thousand/uL      Lymphocytes Absolute 1 80 Thousands/µL      Monocytes Absolute 0 35 Thousand/µL      Eosinophils Absolute 0 12 Thousand/µL      Basophils Absolute 0 03 Thousands/µL     POCT pregnancy, urine [[id number]]  (Normal) Resulted: 05/31/22 2030    Lab Status: Final result Updated: 05/31/22 2031     EXT PREG TEST UR (Ref: Negative) negative     Control valid                 IR lumbar puncture   Final Result by Jolie Jean DO (06/01 1258)   Impression:      Successful lumbar puncture under fluoroscopic guidance  Workstation performed: [id number]         CT head without contrast   Final Result by Scott Gutierrez MD (05/31 2156)      No acute intracranial abnormality  Workstation performed: [id number]                    Procedures  Procedures         ED Course                                             MDM  Number of Diagnoses or Management Options  Papilledema: new and requires workup  Diagnosis management comments: ddx includes but is not limited to idiopathic intracranial hypertension, migraine, tumor, ICH, mass  Plan: CT head  Discussed LP, pt would like to defer for IR procedure  Amount and/or Complexity of Data Reviewed  Clinical lab tests: reviewed and ordered  Tests in the radiology section of CPT®: ordered and reviewed    Risk of Complications, Morbidity, and/or Mortality  Presenting problems: moderate  Management options: low  General comments: 45 yo with headache, blurred vision, papilledema  CT head negative  Could be idiopathic intracranial hypertension  In setting of her now having vision changes - will admit for further workup  Pt in agreement  Stable at time of admission       Patient Progress  Patient progress: stable      Disposition  Final diagnoses:   Papilledema     Time reflects when diagnosis was documented in both MDM as applicable and the Disposition within this note     Time User Action Codes Description Comment    5/31/2022 10:25 PM Anisa Saldana Add [H47 11] Bilateral papilledema due to raised intracranial pressure     5/31/2022 10:25 PM Lawrence MCCARTHY Remove [H47 11] Bilateral papilledema due to raised intracranial pressure     5/31/2022 10:25 PM Anisa Saldana Add [K26 72] Papilledema       ED Disposition     ED Disposition   Admit    Condition   Stable    Date/Time   Tue May 31, 2022 10:25 PM    Comment   Case was discussed with Chino Villarreal and the patient's admission status was agreed to be Admission Status: observation status to the service of Dr Hardik Heath   Follow-up Information     Follow up With Specialties Details Why Daniel Post MD Pediatrics Follow up in 1 week(s)  [place] [address]  [address]  [facility]      Brock Zimmer MD Neurology Schedule an appointment as soon as possible for a visit in 1 week(s)  [place] [id number] [place] [address]            Discharge Medication List as of 6/1/2022  3:01 PM      START taking these medications    Details   acetaZOLAMIDE (DIAMOX) 250 mg tablet Multiple Dosages:Starting Wed 6/1/2022, Until Fri 6/3/2022 at 2359, THEN Starting Sat 6/4/2022, Until Thu 6/30/2022 at 2359Take 1 tablet (250 mg total) by mouth every 12 (twelve) hours for 3 days, THEN 2 tablets (500 mg total) every 12 (twelve) hours  for 27 days  , Normal         CONTINUE these medications which have NOT CHANGED    Details   sertraline (ZOLOFT) 50 mg tablet Take 50 mg by mouth daily, Historical Med             Outpatient Discharge Orders   Basic metabolic panel (BMP)   Standing Status: Future Standing Exp  Date: 06/01/23     Ambulatory Referral to Neurology   Standing Status: Future Standing Exp   Date: 06/01/23      Activity as tolerated       PDMP Review     None          ED Provider  Electronically Signed by           Holden Rabago PA-C  06/07/22 7457

## 2022-06-01 NOTE — CONSULTS
Consultation - Neurology   Luci Escalona 44 y o  female MRN: [id number]  Unit/Bed#: LETY Encounter: [id number]      Assessment/Plan     * Pseudotumor cerebri  Assessment & Plan  37F with no significant medical history who was referred to the ED 5/31/2022 by her ophthalmologist secondary to exam findings concerning for papilledema, increased occular pressures and inflammed optic nerve  The patient reports that she has been experiencing  intermittent headaches since having Covid-19 12/2021  She describes her headaches as variable; sometimes bilateral temporal area but more often sinus pressure  She tried prn tylenol and motrin with some relief  She states that in addition to her headaches, she felt dizzy when quickly moving to an upright position, blurred vision , photophobia, and was seeing colored spots x 2 weeks  She also reported intermittent numbness and tingling in her bilateral fingers which is not new  Initial /86  LP 6/1/2022 with OP 29, CP 12 consistent with idiopathic intracranial hypertension    Imaging:  [facility] 5/31:  IMPRESSION:  No acute intracranial abnormality  Pertinent labs:  CSF studies pending    Recommendations:  - no need for MRI  - start diamox 250mg bid for 3 days then increase to 500mg bid   - outpatient neurology follow-up in 4-6 weeks          Luci Escalona will need follow up in 4-6 weeks with headache attending or advance practitioner  She will not require outpatient neurological testing  Reason for Consult / Principal Problem: "papilledema"    HPI: Luci Escalona is a 44 y o  right handed female with no significant medical history who was referred to the ED 5/31/2022 by her ophthalmologist secondary to exam findings concerning for papilledema, increased occular pressures and inflammed optic nerve  The patient reports that she has been experiencing  intermittent headaches since having Covid-19 12/2021   She describes her headaches as variable; sometimes bilateral temporal area but more often sinus pressure  She tried prn tylenol and motrin with some relief  She states that in addition to her headaches, she felt dizzy when quickly moving to an upright position, blurred vision , photophobia, and was seeing colored spots x 2 weeks  She also reported intermittent numbness and tingling in her bilateral fingers which is not new  No medical history    Inpatient consult to Neurology  Consult performed by: ZINA Orellana  Consult ordered by: David Camacho PA-C        Review of Systems   Constitutional: Negative for chills and fever  Eyes: Positive for photophobia and visual disturbance  Negative for pain  Respiratory: Negative for shortness of breath  Cardiovascular: Negative for chest pain  Gastrointestinal: Negative for abdominal pain, nausea and vomiting  Musculoskeletal: Negative for gait problem  Neurological: Positive for headaches  Negative for dizziness, speech difficulty, weakness, light-headedness and numbness  Historical Information   History reviewed  No pertinent past medical history  Past Surgical History:   Procedure Laterality Date    IR LUMBAR PUNCTURE  6/1/2022     Social History   Social History     Substance and Sexual Activity   Alcohol Use Not Currently     Social History     Substance and Sexual Activity   Drug Use Not Currently     E-Cigarette/Vaping     E-Cigarette/Vaping Substances     Social History     Tobacco Use   Smoking Status Never Smoker   Smokeless Tobacco Never Used     Family History: History reviewed  No pertinent family history  Review of previous medical records was completed       Meds/Allergies   current meds:   Current Facility-Administered Medications   Medication Dose Route Frequency    acetaminophen (TYLENOL) tablet 650 mg  650 mg Oral Q6H PRN    acetaZOLAMIDE (DIAMOX) tablet 250 mg  250 mg Oral Q12H DENI    LORazepam (ATIVAN) injection 1 mg  1 mg Intravenous Once PRN    sertraline (ZOLOFT) tablet 50 mg  50 mg Oral Daily    sodium chloride 0 9 % infusion  125 mL/hr Intravenous Continuous    and PTA meds:   Prior to Admission Medications   Prescriptions Last Dose Informant Patient Reported? Taking?   sertraline (ZOLOFT) 50 mg tablet Past Week at Unknown time  Yes Yes   Sig: Take 50 mg by mouth daily      Facility-Administered Medications: None     No Known Allergies    Objective   Vitals:Blood pressure 140/76, pulse 68, temperature 98 9 °F (37 2 °C), resp  rate 22, height 5' 9" (1 753 m), weight 90 7 kg (200 lb), SpO2 100 %  ,Body mass index is 29 53 kg/m²  No intake or output data in the 24 hours ending 06/01/22 1600    Invasive Devices: Invasive Devices  Report    Peripheral Intravenous Line  Duration           Peripheral IV 05/31/22 Left Antecubital <1 day              Physical Exam  Eyes:      Extraocular Movements: EOM normal       Pupils: Pupils are equal, round, and reactive to light  Neurological:      Mental Status: She is oriented to person, place, and time  Coordination: Finger-Nose-Finger Test normal       Deep Tendon Reflexes: Strength normal       Reflex Scores:       Brachioradialis reflexes are 1+ on the right side and 1+ on the left side  Patellar reflexes are 1+ on the right side and 1+ on the left side  Psychiatric:         Speech: Speech normal        Neurologic Exam     Mental Status   Oriented to person, place, and time  Follows 2 step commands  Attention: normal  Concentration: normal    Speech: speech is normal   Level of consciousness: alert  Knowledge: good  Able to perform simple calculations  Able to name object  Able to read  Able to repeat  Normal comprehension  Cranial Nerves     CN II   Visual acuity: normal with correction  Right visual field deficit: none  Left visual field deficit: none     CN III, IV, VI   Pupils are equal, round, and reactive to light    Extraocular motions are normal    CN III: no CN III palsy  CN VI: no CN VI palsy  Nystagmus: none Ophthalmoparesis: none  Conjugate gaze: present    CN V   Facial sensation intact  CN VII   Facial expression full, symmetric  CN VIII   Hearing: intact    CN IX, X   Palate: symmetric    CN XI   Right sternocleidomastoid strength: normal  Right trapezius strength: normal    CN XII   Tongue deviation: none    Motor Exam     Strength   Strength 5/5 throughout  Sensory Exam   Light touch normal    Vibration normal      Gait, Coordination, and Reflexes     Coordination   Finger to nose coordination: normal    Tremor   Resting tremor: absent    Reflexes   Right brachioradialis: 1+  Left brachioradialis: 1+  Right patellar: 1+  Left patellar: 1+  Right plantar: equivocal  Left plantar: equivocalGait deferred     Lab Results:   CBC:   Results from last 7 days   Lab Units 06/01/22  0500 05/31/22  2040   WBC Thousand/uL 4 02* 4 15*   RBC Million/uL 3 80* 3 89   HEMOGLOBIN g/dL 10 6* 10 9*   HEMATOCRIT % 34 1* 35 6   MCV fL 90 92   PLATELETS Thousands/uL 269 296   , BMP/CMP:   Results from last 7 days   Lab Units 06/01/22  0500 05/31/22 2040   SODIUM mmol/L 138 137   POTASSIUM mmol/L 3 3* 3 6   CHLORIDE mmol/L 105 103   CO2 mmol/L 25 27   BUN mg/dL 12 15   CREATININE mg/dL 0 76 0 82   CALCIUM mg/dL 8 4 8 7   AST U/L 21 22   ALT U/L 26 28   ALK PHOS U/L 86 98   EGFR ml/min/1 73sq m 99 91   Coagulation:   Results from last 7 days   Lab Units 05/31/22 2040   INR  1 13     Imaging Studies: I have personally reviewed pertinent reports  and I have personally reviewed pertinent films in PACS  EKG, Pathology, and Other Studies: I have personally reviewed pertinent reports  Code Status: Level 1 - Full Code    Counseling / Coordination of Care  Assessment, images and plan reviewed with Dr Martha Baker   Plan discussed with patient and primary service

## 2022-06-01 NOTE — DISCHARGE INSTRUCTIONS
Lumbar Puncture     WHAT YOU NEED TO KNOW:   Lumbar puncture (LP) is a procedure in which a needle is inserted in your back and into your spinal canal  This is usually done to collect cerebrospinal fluid (CSF) to check for an infection, inflammation, bleeding, or other conditions that affect the brain  CSF is a clear, protective fluid that flows around the brain and inside the spinal canal  LP may also be done to remove CSF to reduce pressure in the brain  DISCHARGE INSTRUCTIONS:     Follow up with your healthcare provider as directed: Write down your questions so you remember to ask them during your visits  Post-lumbar puncture headache: You may develop a headache during the first few hours after your LP that may last for several days  The headache may be mild to severe and may get worse when you sit or stand  The following may help ease a post-lumbar puncture headache:  Drink plenty of liquids: You should drink more liquid than usual after your LP  Ask how much liquid is right for you  Caffeine may be used to treat a headache  Drinks, such as coffee, tea, or some sodas, have caffeine  Ask a Do not drink alcohol  Lie down: If you have a headache after your lumbar puncture, it may be helpful to lie down and rest   You may have a slight soreness over the LP area  This is normal   Remove the band aid or dressing in 24 hours  Contact Interventional Radiology imediately  at [phone]  if any of the following occur: You have a severe headache that does not get better after you lie down  Persistent nausea or vomiting   You have a fever  You have a stiff neck or have trouble thinking clearly  Your legs, feet, or other parts below the waist feel numb, tingly, or weak  You have bleeding or a discharge coming from the area where the needle was put into your back  You have severe pain in your back or neck

## 2022-06-01 NOTE — UTILIZATION REVIEW
Initial Clinical Review    Admission: Date/Time/Statement:   Admission Orders (From admission, onward)     Ordered        05/31/22 2226  Place in Observation  Once                      Orders Placed This Encounter   Procedures    Place in Observation     Standing Status:   Standing     Number of Occurrences:   1     Order Specific Question:   Level of Care     Answer:   Med Surg [16]     ED Arrival Information     Expected   -    Arrival   5/31/2022 18:55    Acuity   Urgent            Means of arrival   Walk-In    Escorted by   Self    Service   Hospitalist    Admission type   Urgent            Arrival complaint   Eye Problem           Chief Complaint   Patient presents with    Eye Problem     Has been having headaches, seen at eye dr Ghanshyam Ko and was told she had "elevated pressure behind both eyes" referred to ED for eval        Initial Presentation: 44 y o  female with PMH of anxiety presented to the ED from OP opthalmology office with b/l papilledema  Pt reported going to her Op ophthalmologist d/t blurred vision and intermittent headaches x 2 days, found bilateral papilledema on examination and referred her to the ED for further workup  In the ED, on exam, no neurologic deficits seen   CT head:  No acute intracranial abnormality    Admit as observation level of care for papilledema:   · Obtain MRI  · Consult IR for LP in the morning to evaluate for idiopathic intracranial hypertension  · Neuro checks  · Consult to Neurology        ED Triage Vitals   Temperature Pulse Respirations Blood Pressure SpO2   05/31/22 1859 05/31/22 1859 05/31/22 1859 05/31/22 1859 05/31/22 1859   98 9 °F (37 2 °C) 63 18 143/86 100 %      Temp src Heart Rate Source Patient Position - Orthostatic VS BP Location FiO2 (%)   -- -- 05/31/22 2210 05/31/22 2210 --     Sitting Right arm       Pain Score       05/31/22 2318       No Pain          Wt Readings from Last 1 Encounters:   05/31/22 90 7 kg (200 lb)     Additional Vital Signs: Date/Time Temp Pulse Resp BP MAP (mmHg) SpO2 O2 Device Patient Position - Orthostatic VS   06/01/22 0800 -- 68 -- 138/71 98 100 % -- --   06/01/22 0300 -- 83 -- -- -- 97 % -- --   05/31/22 2318 -- 66 16 166/87 -- 100 % None (Room air) --   05/31/22 2210 -- 62 18 137/89 108 100 % None (Room air) Sitting       Pertinent Labs/Diagnostic Test Results:   CT head without contrast   Final Result by Sheldon Portillo MD (05/31 2156)      No acute intracranial abnormality                    Workstation performed: [id number]         MRI inpatient order    (Results Pending)         Results from last 7 days   Lab Units 06/01/22  0500 05/31/22  2040   WBC Thousand/uL 4 02* 4 15*   HEMOGLOBIN g/dL 10 6* 10 9*   HEMATOCRIT % 34 1* 35 6   PLATELETS Thousands/uL 269 296   NEUTROS ABS Thousands/µL  --  1 84*         Results from last 7 days   Lab Units 06/01/22  0500 05/31/22  2040   SODIUM mmol/L 138 137   POTASSIUM mmol/L 3 3* 3 6   CHLORIDE mmol/L 105 103   CO2 mmol/L 25 27   ANION GAP mmol/L 8 7   BUN mg/dL 12 15   CREATININE mg/dL 0 76 0 82   EGFR ml/min/1 73sq m 99 91   CALCIUM mg/dL 8 4 8 7     Results from last 7 days   Lab Units 06/01/22  0500 05/31/22  2040   AST U/L 21 22   ALT U/L 26 28   ALK PHOS U/L 86 98   TOTAL PROTEIN g/dL 6 9 7 6   ALBUMIN g/dL 3 2* 3 7   TOTAL BILIRUBIN mg/dL 0 28 0 24         Results from last 7 days   Lab Units 06/01/22  0500 05/31/22  2040   GLUCOSE RANDOM mg/dL 88 87             No results found for: BETA-HYDROXYBUTYRATE                           Results from last 7 days   Lab Units 05/31/22  2040   PROTIME seconds 14 0   INR  1 13   PTT seconds 32                                                                                                   ED Treatment:   Medication Administration from 05/31/2022 1854 to 06/01/2022 5990       Date/Time Order Dose Route Action Action by Comments     06/01/2022 0915 sertraline (ZOLOFT) tablet 50 mg 50 mg Oral Given Laureano Sexton RN 06/01/2022 0916 potassium chloride (K-DUR,KLOR-CON) CR tablet 20 mEq 20 mEq Oral Given Melita Jackson RN         History reviewed  No pertinent past medical history  Present on Admission:  **None**      Admitting Diagnosis: Eye problem [H57 9]  Age/Sex: 44 y o  female  Admission Orders:  Scheduled Medications:  sertraline, 50 mg, Oral, Daily      Continuous IV Infusions:     PRN Meds:  acetaminophen, 650 mg, Oral, Q6H PRN  LORazepam, 1 mg, Intravenous, Once PRN        IP CONSULT TO NEUROLOGY    Network Utilization Review Department  ATTENTION: Please call with any questions or concerns to [phone] and carefully listen to the prompts so that you are directed to the right person  All voicemails are confidential   [facility] all requests for admission clinical reviews, approved or denied determinations and any other requests to dedicated fax number below belonging to the campus where the patient is receiving treatment   List of dedicated fax numbers for the Facilities:  [address] DENIALS (Administrative/Medical Necessity) [phone]   [address] (Maternity/NICU/Pediatrics) [phone]   [address] [place] [address]  [address] [place] [address] [address] [place] [address] [address] [facility] [address] [facility] [phone]

## 2022-06-01 NOTE — CASE MANAGEMENT
Case Management Progress Note    Patient name Maisha Epperson  Location ED 15/ED 15 MRN [id number]  : 1985 Date 2022       LOS (days): 0  Geometric Mean LOS (GMLOS) (days):   Days to GMLOS:        OBJECTIVE:        Current admission status: Observation  Preferred Pharmacy:   PATIENT/FAMILY REPORTS NO PREFERRED PHARMACY  No address on file      Primary Care Provider: Lawyer Adonis MD    Primary Insurance: BLUE CROSS  Secondary Insurance:     PROGRESS NOTE:    Per chart review, patient is a self from home  No CM needs identified at this time  CM department will continue to follow patient through discharge

## 2022-06-01 NOTE — DISCHARGE SUMMARY
INTERNAL MEDICINE RESIDENCY DISCHARGE SUMMARY     Eamon Rojas   44 y o  female  MRN: [id number]  Room/Bed: ED 15/ED [address] CATH LAB   Encounter: [id number]    Principal Problem:    Papilledema  Active Problems:    Anxiety      Anxiety  Assessment & Plan  · Mood stable  · Continue zoloft    * Papilledema  Assessment & Plan  Patient reports the ED after an ophthalmologist appointment today  Patient reports that she has been having blurred vision and intermittent headaches over the last 2 weeks  During her ophthalmology appointment there was found to be bilateral papilledema on examination and she was referred to the ED for further workup  · No other neuro deficits such as dizziness, slurred speech, facial droop, weakness, numbness/tingling, ataxia  · CT head:  No acute intracranial abnormality  · Obtain MRI  · Consult IR for LP in the morning to evaluate for idiopathic intracranial hypertension  · Neuro checks  · Consult to Neurology, recommendations are appreciated    6/1: LP by IR --> elevated opening pressure of 29  28 cc CSF collected  Fluid analysis unremarkable  Consistent with pseudotumor cerebri  Started on Diamox per neurology; 250 mg BID for three days followed by 500 mg BID  She will follow-up outpatient with neurology  Was given script for BMP in one week to follow-up with PCP  [address] is a 44 y o  female who has a past medical history significant for anxiety, who initially presented for ED evaluation on 5/31/2022 send form her ophthalmologist office for bilateral papilledema  Patient reports two week history of occasional dizziness/blurry vision and headache  Started see colored spots today which prompted visit to her ophthalmologist  Otherwise she is in her usual state of health and denies any other neurological symptoms  Blood work showed mild hypokalemia, mild anemia and leukopenia   Per review of labs at [facility] leukopenia seems chronic but Hb was normal in 2021 and further work-up for anemia can be considered in outpatient setting  Besides menstrual cycles there is no other active bleeding focus  For papilledema neurology was consulted who recommended LP  LP was performed by IR on 6/1 and was significant for elevated opening pressure of 29  28 cc CSF was drained and closing pressure was 12  CSF analysis was unremarkable  Gram stain and culture were pending at time of discharge  Patient was started on Diamox for diagnosis of pseudotumor cerebri and will follow-up outpatient with neurology  BMP in one week to check kidney function and electrolytes was ordered to be followed up with her PCP  On 6/1/2022 patient was deemed stable for discharge to home  DISCHARGE INFORMATION     PCP at Discharge: Anu Leal MD    Admitting Provider: Cyndee Sabillon DO  Admission Date: 5/31/2022    Discharge Provider: Angelo Huang MD  Discharge Date: 6/1/2022    Discharge Disposition: Home/Self Care  Discharge Condition: good  Discharge with Lines: no    Discharge Diet: regular diet  Activity Restrictions: none  Test Results Pending at Discharge: CSF culture and gram stain    Discharge Diagnoses:  Principal Problem:    Papilledema  Active Problems:    Anxiety  Resolved Problems:    * No resolved hospital problems  *      Consulting Providers:  Neurology  IR    Diagnostic & Therapeutic Procedures Performed:  CT head without contrast    Result Date: 5/31/2022  Impression: No acute intracranial abnormality  Workstation performed: [id number]     IR lumbar puncture    Result Date: 6/1/2022  Impression: Impression: Successful lumbar puncture under fluoroscopic guidance   Workstation performed: [id number]       Code Status: Level 1 - Full Code  Advance Directive & Living Will: <no information>  Power of :    POLST:      Medications:  Current Discharge Medication List        Current Discharge Medication List START taking these medications    Details   acetaZOLAMIDE (DIAMOX) 250 mg tablet Take 1 tablet (250 mg total) by mouth every 12 (twelve) hours for 3 days, THEN 2 tablets (500 mg total) every 12 (twelve) hours for 27 days  Qty: 114 tablet, Refills: 0    Associated Diagnoses: Papilledema           Current Discharge Medication List      CONTINUE these medications which have NOT CHANGED    Details   sertraline (ZOLOFT) 50 mg tablet Take 50 mg by mouth daily             Allergies:  No Known Allergies      Discharge Statement:   I spent 30 minutes minutes discharging the patient  This time was spent on the day of discharge  I had direct contact with the patient on the day of discharge  Additional documentation is required if more than 30 minutes were spent on discharge  Portions of the record may have been created with voice recognition software  Occasional wrong word or "sound a like" substitutions may have occurred due to the inherent limitations of voice recognition software    Read the chart carefully and recognize, using context, where substitutions have occurred     ==  Clora Ormond, MD  [address]  Internal Medicine Resident PGY-3

## 2022-06-01 NOTE — ASSESSMENT & PLAN NOTE
Patient reports the ED after an ophthalmologist appointment today  Patient reports that she has been having blurred vision and intermittent headaches over the last 2 weeks  During her ophthalmology appointment there was found to be bilateral papilledema on examination and she was referred to the ED for further workup  · No other neuro deficits such as dizziness, slurred speech, facial droop, weakness, numbness/tingling, ataxia  · CT head:  No acute intracranial abnormality  · Obtain MRI  · Consult IR for LP in the morning to evaluate for idiopathic intracranial hypertension  · Neuro checks  · Consult to Neurology, recommendations are appreciated    6/1: LP by IR --> elevated opening pressure of 29  28 cc CSF collected  Fluid analysis unremarkable  Consistent with pseudotumor cerebri  Started on Diamox per neurology; 250 mg BID for three days followed by 500 mg BID  She will follow-up outpatient with neurology  Was given script for BMP in one week to follow-up with PCP

## 2022-06-04 LAB — BACTERIA CSF CULT: NO GROWTH

## 2022-06-08 ENCOUNTER — OFFICE VISIT (OUTPATIENT)
Dept: NEUROLOGY | Facility: CLINIC | Age: 37
End: 2022-06-08
Payer: COMMERCIAL

## 2022-06-08 VITALS
DIASTOLIC BLOOD PRESSURE: 78 MMHG | WEIGHT: 211.4 LBS | HEIGHT: 69 IN | HEART RATE: 82 BPM | BODY MASS INDEX: 31.31 KG/M2 | SYSTOLIC BLOOD PRESSURE: 126 MMHG

## 2022-06-08 DIAGNOSIS — H47.10 PAPILLEDEMA: ICD-10-CM

## 2022-06-08 DIAGNOSIS — H47.10 PAPILLEDEMA: Primary | ICD-10-CM

## 2022-06-08 PROCEDURE — 99215 OFFICE O/P EST HI 40 MIN: CPT | Performed by: PSYCHIATRY & NEUROLOGY

## 2022-06-08 RX ORDER — ACETAZOLAMIDE 250 MG/1
TABLET ORAL
Qty: 120 TABLET | Refills: 5 | Status: SHIPPED | OUTPATIENT
Start: 2022-06-08 | End: 2022-07-18

## 2022-06-08 NOTE — PROGRESS NOTES
Don Dumont is a 44 y o  female  Chief Complaint   Patient presents with    Papilledema       Assessment:  1  Pseudotumor cerebri        Plan:  MRI of brain  Continue with Diamox 500 mg twice a day  Follow-up in 3 months    Discussion:  Patient's recent imaging study results the CT scan of the brain and other hospital records were reviewed, patient with increased intracranial pressure 29 and papilledema currently doing well on Diamox of 500 mg twice a day, refills were sent, she is going to get repeat blood work prior to the next visit she was advised to follow up with her family physician regarding her low WBC count and hemoglobin and other abnormal blood work also to follow-up with Ophthalmology to monitor the papilledema patient is claustrophobic and hence cannot get a closed MRI I have given a prescription for an MRI scan of the brain she is going to do as an open MRI, currently her headaches have resolved, she was advised to closely follow-up with the ophthalmologist to monitor her papilledema, to go to the hospital if has any worsening symptoms and call me otherwise to see me back 3 months and follow up with the other physicians    Subjective:    HPI   27-year-old female with no significant past medical history who was recently admitted to [facility] for papilledema and for headaches since patient had COVID and December of 2021, she was found to have an opening pressure of 29 and was started on Diamox 250 twice a day and subsequently increased to 500 twice a day, since her discharge from the hospital patient denies having any headaches her visual symptoms have resolved except that she feels a little vibration sensation on the tongue since she started the Diamox no numbness and tingling in arms or legs no speech difficulty no focal weakness no other complaints      Vitals:    06/08/22 1004   BP: 126/78   BP Location: Left arm   Patient Position: Sitting   Cuff Size: Standard   Pulse: 82 Weight: 95 9 kg (211 lb 6 4 oz)   Height: 5' 9" (1 753 m)       Current Medications    Current Outpatient Medications:     acetaZOLAMIDE (DIAMOX) 250 mg tablet, Take 1 tablet (250 mg total) by mouth every 12 (twelve) hours for 3 days, THEN 2 tablets (500 mg total) every 12 (twelve) hours for 27 days  (Patient taking differently: Currently 500 mg PO BID), Disp: 114 tablet, Rfl: 0    sertraline (ZOLOFT) 50 mg tablet, Take 50 mg by mouth daily, Disp: , Rfl:       Allergies  Patient has no known allergies  Past Medical History  History reviewed  No pertinent past medical history  Past Surgical History:  Past Surgical History:   Procedure Laterality Date    IR LUMBAR PUNCTURE  6/1/2022         Family History:  Family History   Problem Relation Age of Onset    Arthritis Mother     Arthritis Father     Diabetes Father     Hypertension Father        Social History:   reports that she has never smoked  She has never used smokeless tobacco  She reports previous alcohol use  She reports previous drug use  I have reviewed the past medical history, surgical history, social and family history, current medications, allergies vitals, review of systems, and updated this information as appropriate today  Objective:    Physical Exam    Neurological Exam    GENERAL:  Cooperative in no acute distress  Well-developed and well-nourished    HEAD and NECK   Head is atraumatic normocephalic with no lesions or masses  Neck is supple with full range of motion    CARDIOVASCULAR  Carotid Arteries-no carotid bruits  NEUROLOGIC:  Mental Status-the patient is awake alert and oriented without aphasia or apraxia  Cranial Nerves: Visual fields are full to confrontation  Visual acuity is 20/20 with hand-held chart funduscopic examination could not be done as pupils were not dilated Extraocular movements are full without nystagmus  Pupils are 2-1/2 mm and reactive  Face is symmetrical to light touch   Movements of facial expression move symmetrically  Hearing is normal to finger rub bilaterally  Soft palate lifts symmetrically  Shoulder shrug is symmetrical  Tongue is midline without atrophy  Motor: No drift is noted on arm extension  Strength is full in the upper and lower extremities with normal bulk and tone  Sensory: Intact to temperature and vibratory sensation in the upper and lower extremities bilaterally  Cortical function is intact  Coordination: Finger to nose testing is performed accurately  Romberg is negative  Gait reveals a normal base with symmetrical arm swing  Tandem walk is normal   Reflexes:  2+ and symmetrical  No cervical spine tenderness          ROS:  Review of Systems   Constitutional: Positive for fatigue  HENT: Negative  Eyes: Positive for pain (Tension/Pressure) and visual disturbance  Respiratory: Negative  Cardiovascular: Negative  Gastrointestinal: Negative  Endocrine: Negative  Genitourinary: Negative  Musculoskeletal: Negative  Skin: Negative  Allergic/Immunologic: Negative  Neurological: Positive for headaches  Hematological: Negative  Psychiatric/Behavioral: Negative

## 2022-06-13 ENCOUNTER — TELEPHONE (OUTPATIENT)
Dept: NEUROLOGY | Facility: CLINIC | Age: 37
End: 2022-06-13

## 2022-06-13 NOTE — TELEPHONE ENCOUNTER
Pt calling to inform that she was prescribed iron supplements  Pt asking if this will interfere with her Diamox  Dr Paloma Cai - Please advise  Son  [phone], ok to leave detailed message

## 2022-06-23 ENCOUNTER — TELEPHONE (OUTPATIENT)
Dept: NEUROLOGY | Facility: CLINIC | Age: 37
End: 2022-06-23

## 2022-06-23 NOTE — TELEPHONE ENCOUNTER
Received call from Dr Jaskaran Raza, pt's optometrist  He reports he had initally sent pt to ER for papilledema, pt is now established with our office  He saw pt today for a 1 month f/u  He is aware pt has been prescribed diamox  States swelling still exists, this has improved but hasn't fully resolved  Pt does feel headaches have improved  Notes he has had other patients that had this resolve faster  He will f/u with patient in 1 month  Just wanted to make our office aware       Dr Reba Diaz   [phone]  cell [phone]

## 2022-06-28 ENCOUNTER — TELEPHONE (OUTPATIENT)
Dept: NEUROLOGY | Facility: CLINIC | Age: 37
End: 2022-06-28

## 2022-06-28 NOTE — TELEPHONE ENCOUNTER
Patient called regarding MRI brain results  She had test performed at [address]  She did obtain MRI disc  She will bring disc to appt tomorrow  Advised pt to call [address] and request result report be faxed to our office @ [phone]  Patient verbalized understanding

## 2022-06-28 NOTE — TELEPHONE ENCOUNTER
ADD ON:    Called patient and offered her a 930 am appointment with Dr Farida Abreu on 6-29-22 and she accepted

## 2022-06-29 ENCOUNTER — OFFICE VISIT (OUTPATIENT)
Dept: NEUROLOGY | Facility: CLINIC | Age: 37
End: 2022-06-29
Payer: COMMERCIAL

## 2022-06-29 VITALS
OXYGEN SATURATION: 99 % | HEART RATE: 74 BPM | TEMPERATURE: 98 F | HEIGHT: 69 IN | DIASTOLIC BLOOD PRESSURE: 80 MMHG | BODY MASS INDEX: 30.81 KG/M2 | WEIGHT: 208 LBS | SYSTOLIC BLOOD PRESSURE: 120 MMHG

## 2022-06-29 DIAGNOSIS — H47.10 PAPILLEDEMA: Primary | ICD-10-CM

## 2022-06-29 PROCEDURE — 99214 OFFICE O/P EST MOD 30 MIN: CPT | Performed by: PSYCHIATRY & NEUROLOGY

## 2022-06-29 RX ORDER — FERROUS SULFATE 325(65) MG
1 TABLET ORAL
COMMUNITY
Start: 2022-06-13

## 2022-06-29 RX ORDER — ACETAZOLAMIDE 500 MG/1
500 CAPSULE, EXTENDED RELEASE ORAL 2 TIMES DAILY
Qty: 60 CAPSULE | Refills: 5 | Status: SHIPPED | OUTPATIENT
Start: 2022-06-29

## 2022-06-29 NOTE — PROGRESS NOTES
Kavita Rodriguez is a 44 y o  female  Chief Complaint   Patient presents with    Pseudotumor cerebri        Assessment:  1  Pseudotumor cerebri        Plan:  Continue with Diamox 500 mg twice a day  Referral to Neuro-Ophthalmology  MRV  Follow-up in 2 to 3  months  Discussion:  Patient with pseudotumor cerebri with an opening pressure of 29, currently on Diamox 500 mg twice a day and is tolerating it well and is doing well  Would refer patient to see a neuro ophthalmologist, he was also advised to continue follow-up with her regular ophthalmologist and will check an MRV, continue with Diamox check routine blood work to keep her blood pressure cholesterol and sugar under control to go to the hospital if has any worsening symptoms and call me otherwise to see me back in 2-3 months and follow up with other physicians  Subjective:    HPI   Patient is here in follow-up for her headaches and pseudotumor cerebri, is currently on Diamox 500 mg twice a day and is tolerating it well does not have any headaches her vision symptoms are better except for sometimes that she will see like a strand of light in the right eye, she was recently seen by ophthalmologist and felt that her papilledema is getting better but she still does have some papilledema, no double vision no numbness or tingling no focal weakness no other complaints  Vitals:    06/29/22 0937   BP: 120/80   BP Location: Right arm   Patient Position: Sitting   Cuff Size: Adult   Pulse: 74   SpO2: 99%   Height: 5' 9" (1 753 m)       Current Medications    Current Outpatient Medications:     acetaZOLAMIDE (DIAMOX) 250 mg tablet, Take 1 tablet (250 mg total) by mouth every 12 (twelve) hours for 3 days, THEN 2 tablets (500 mg total) every 12 (twelve) hours for 27 days   (Patient taking differently: Currently 500 mg PO BID), Disp: 114 tablet, Rfl: 0    acetaZOLAMIDE (DIAMOX) 250 mg tablet, 2 tablets p o  b i d , Disp: 120 tablet, Rfl: 5    ferrous sulfate 325 (65 Fe) mg tablet, Take 1 tablet by mouth daily with breakfast, Disp: , Rfl:     sertraline (ZOLOFT) 50 mg tablet, Take 50 mg by mouth daily, Disp: , Rfl:       Allergies  Patient has no known allergies  Past Medical History  History reviewed  No pertinent past medical history  Past Surgical History:  Past Surgical History:   Procedure Laterality Date    IR LUMBAR PUNCTURE  6/1/2022         Family History:  Family History   Problem Relation Age of Onset    Arthritis Mother     Arthritis Father     Diabetes Father     Hypertension Father        Social History:   reports that she has never smoked  She has never used smokeless tobacco  She reports previous alcohol use  She reports previous drug use  I have reviewed the past medical history, surgical history, social and family history, current medications, allergies vitals, review of systems, and updated this information as appropriate today  Objective:    Physical Exam    Neurological Exam    GENERAL:  Cooperative in no acute distress  Well-developed and well-nourished    HEAD and NECK   Head is atraumatic normocephalic with no lesions or masses  Neck is supple with full range of motion    CARDIOVASCULAR  Carotid Arteries-no carotid bruits  NEUROLOGIC:  Mental Status-the patient is awake alert and oriented without aphasia or apraxia  Cranial Nerves: Visual fields are full to confrontation  Funduscopic examination could not be done, visual acuity is 20/20 Extraocular movements are full without nystagmus  Pupils are 2-1/2 mm and reactive  Face is symmetrical to light touch  Movements of facial expression move symmetrically  Hearing is normal to finger rub bilaterally  Soft palate lifts symmetrically  Shoulder shrug is symmetrical  Tongue is midline without atrophy  Motor: No drift is noted on arm extension  Strength is full in the upper and lower extremities with normal bulk and tone    Gait is unremarkable          ROS:  Review of Systems Constitutional: Negative  Negative for appetite change and fever  HENT: Negative  Negative for hearing loss, tinnitus, trouble swallowing and voice change  Eyes: Negative  Negative for photophobia and pain  Respiratory: Negative  Negative for shortness of breath  Cardiovascular: Negative  Negative for palpitations  Gastrointestinal: Positive for nausea  Negative for vomiting  Endocrine: Negative  Negative for cold intolerance  Genitourinary: Negative  Negative for dysuria, frequency and urgency  Musculoskeletal: Negative  Negative for myalgias and neck pain  Skin: Negative  Negative for rash  Neurological: Negative  Negative for dizziness, tremors, seizures, syncope, facial asymmetry, speech difficulty, weakness, light-headedness, numbness and headaches  Hematological: Negative  Does not bruise/bleed easily  Psychiatric/Behavioral: Negative  Negative for confusion, hallucinations and sleep disturbance

## 2022-07-18 DIAGNOSIS — H47.10 PAPILLEDEMA: ICD-10-CM

## 2022-07-18 RX ORDER — ACETAZOLAMIDE 250 MG/1
TABLET ORAL
Qty: 360 TABLET | Refills: 2 | Status: SHIPPED | OUTPATIENT
Start: 2022-07-18

## 2022-07-22 ENCOUNTER — TELEPHONE (OUTPATIENT)
Dept: NEUROLOGY | Facility: CLINIC | Age: 37
End: 2022-07-22

## 2022-07-22 NOTE — TELEPHONE ENCOUNTER
Patient is looking for results of her MRV Head  Results are available under Media for today's date  Please review and advise      CB# [phone]

## 2022-08-26 DIAGNOSIS — H47.10 PAPILLEDEMA: ICD-10-CM

## 2022-08-26 RX ORDER — ACETAZOLAMIDE 500 MG/1
CAPSULE, EXTENDED RELEASE ORAL
Qty: 180 CAPSULE | Refills: 2 | Status: SHIPPED | OUTPATIENT
Start: 2022-08-26

## 2022-11-08 ENCOUNTER — TELEPHONE (OUTPATIENT)
Dept: NEUROLOGY | Facility: CLINIC | Age: 37
End: 2022-11-08

## 2022-11-08 NOTE — TELEPHONE ENCOUNTER
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    I thank you in advance,     Ulisses Clemens

## 2023-01-04 ENCOUNTER — TELEPHONE (OUTPATIENT)
Dept: NEUROLOGY | Facility: CLINIC | Age: 38
End: 2023-01-04

## 2023-01-04 NOTE — TELEPHONE ENCOUNTER
Received VM transcription:    Hi. Hello, my name is Kristine Calle. I'm calling in regards to symptoms I've been feeling. Some headaches that have been onset since about Wednesday night, Thursday morning. They've been off and on and they've been pretty intense. The only get very, very mild with Tylenol, they don't disappear. I know I have the history of intracranial hypertension so I am calling to hopefully get an immediate response about what should I do next. I don't If I should go to urgent care, go to the emergency room. I don't know if there's something that's going on because of the headaches or is it a normal-like symptom. Thank you. My number is [phone].  --------------------------------------------------------    Dr. Holliday - Please advise.

## 2023-01-04 NOTE — TELEPHONE ENCOUNTER
Samuel Holliday MD  to Me • Neurology [facility] Clinical Team 6      1:41 PM  I left a detailed message for the patient, supposed to have an MRV which I ordered at the last visit I did not see that in the computer please tell the patient to have it also she was advised to see a neuro-ophthalmologist please make sure that she is following up with a neuro-ophthalmologist or her regular ophthalmologist to check for papilledema and also make an appointment for her to see me needs to continue the Diamox for now if anything gets worse to go to the hospital and call us

## 2023-01-04 NOTE — TELEPHONE ENCOUNTER
Left detailed message informing pt of Dr. Holliday's response and recommendations. Advised her to call back if further questions. Provided her with phone number to central scheduling.

## 2023-02-08 ENCOUNTER — TELEPHONE (OUTPATIENT)
Dept: NEUROLOGY | Facility: CLINIC | Age: 38
End: 2023-02-08

## 2023-02-08 NOTE — TELEPHONE ENCOUNTER
Called patient and left voicemail to inform them that their appointment with Dr Escobar Laurent on 5/5/23 will have to be rescheduled because Dr Escobar Laurent will not be in the office that day and asked that they please call back at their earliest convenience so we can reschedule them

## 2023-05-25 ENCOUNTER — TELEPHONE (OUTPATIENT)
Dept: NEUROLOGY | Facility: CLINIC | Age: 38
End: 2023-05-25

## 2024-12-09 ENCOUNTER — APPOINTMENT (EMERGENCY)
Dept: RADIOLOGY | Facility: HOSPITAL | Age: 39
End: 2024-12-09
Payer: OTHER MISCELLANEOUS

## 2024-12-09 ENCOUNTER — HOSPITAL ENCOUNTER (EMERGENCY)
Facility: HOSPITAL | Age: 39
Discharge: HOME | End: 2024-12-09
Attending: EMERGENCY MEDICINE
Payer: OTHER MISCELLANEOUS

## 2024-12-09 VITALS
OXYGEN SATURATION: 100 % | WEIGHT: 205 LBS | RESPIRATION RATE: 18 BRPM | TEMPERATURE: 97.5 F | DIASTOLIC BLOOD PRESSURE: 65 MMHG | BODY MASS INDEX: 30.27 KG/M2 | HEART RATE: 65 BPM | SYSTOLIC BLOOD PRESSURE: 142 MMHG

## 2024-12-09 DIAGNOSIS — S29.9XXA INJURY OF UPPER BACK, INITIAL ENCOUNTER: Primary | ICD-10-CM

## 2024-12-09 PROCEDURE — 72040 X-RAY EXAM NECK SPINE 2-3 VW: CPT

## 2024-12-09 PROCEDURE — 99283 EMERGENCY DEPT VISIT LOW MDM: CPT

## 2024-12-09 PROCEDURE — 72072 X-RAY EXAM THORAC SPINE 3VWS: CPT

## 2024-12-09 PROCEDURE — 99284 EMERGENCY DEPT VISIT MOD MDM: CPT | Performed by: NURSE PRACTITIONER

## 2024-12-09 RX ORDER — IBUPROFEN 600 MG/1
600 TABLET, FILM COATED ORAL ONCE
Status: COMPLETED | OUTPATIENT
Start: 2024-12-09 | End: 2024-12-09

## 2024-12-09 RX ORDER — HYDROCODONE BITARTRATE AND ACETAMINOPHEN 5; 325 MG/1; MG/1
1 TABLET ORAL EVERY 6 HOURS PRN
Qty: 12 TABLET | Refills: 0 | Status: SHIPPED | OUTPATIENT
Start: 2024-12-09

## 2024-12-09 RX ORDER — CYCLOBENZAPRINE HCL 10 MG
10 TABLET ORAL 3 TIMES DAILY PRN
Qty: 21 TABLET | Refills: 0 | Status: SHIPPED | OUTPATIENT
Start: 2024-12-09 | End: 2024-12-16

## 2024-12-09 RX ORDER — IBUPROFEN 600 MG/1
600 TABLET, FILM COATED ORAL EVERY 6 HOURS PRN
Qty: 20 TABLET | Refills: 0 | Status: SHIPPED | OUTPATIENT
Start: 2024-12-09 | End: 2024-12-14

## 2024-12-09 RX ORDER — LIDOCAINE 50 MG/G
1 PATCH TOPICAL ONCE
Status: DISCONTINUED | OUTPATIENT
Start: 2024-12-09 | End: 2024-12-09 | Stop reason: HOSPADM

## 2024-12-09 RX ADMIN — IBUPROFEN 600 MG: 600 TABLET, FILM COATED ORAL at 14:10

## 2024-12-09 RX ADMIN — LIDOCAINE 5% 1 PATCH: 700 PATCH TOPICAL at 14:10

## 2024-12-09 NOTE — Clinical Note
Kristine Calle was seen and treated in our emergency department on 12/9/2024.    No restrictions            Diagnosis:     Kristine  .    She may return on this date: 12/10/2024         If you have any questions or concerns, please don't hesitate to call.      Julie Lynn Gutzweiler, PA-C    ______________________________           _______________          _______________  Hospital Representative                              Date                                Time

## 2024-12-09 NOTE — ED PROVIDER NOTES
Time reflects when diagnosis was documented in both MDM as applicable and the Disposition within this note       Time User Action Codes Description Comment    12/9/2024  2:50 PM Juan Francisco Candelario Add [S29.9XXA] Injury of upper back, initial encounter           ED Disposition       ED Disposition   Discharge    Condition   Stable    Date/Time   Mon Dec 9, 2024  2:50 PM    Comment   Kristine Calle discharge to home/self care.                   Assessment & Plan       Medical Decision Making  Plain films were interpreted by myself no compression fracture or malalignment.  Some degenerative changes.  Supportive therapy with anti-inflammatories muscle relaxers and pain control.    Amount and/or Complexity of Data Reviewed  Radiology: ordered.    Risk  Prescription drug management.             Medications   lidocaine (LIDODERM) 5 % patch 1 patch (1 patch Topical Medication Applied 12/9/24 1410)   ibuprofen (MOTRIN) tablet 600 mg (600 mg Oral Given 12/9/24 1410)       ED Risk Strat Scores                           SBIRT 20yo+      Flowsheet Row Most Recent Value   Initial Alcohol Screen: US AUDIT-C     1. How often do you have a drink containing alcohol? 0 Filed at: 12/09/2024 1220   2. How many drinks containing alcohol do you have on a typical day you are drinking?  0 Filed at: 12/09/2024 1220   3b. FEMALE Any Age, or MALE 65+: How often do you have 4 or more drinks on one occassion? 0 Filed at: 12/09/2024 1220   Audit-C Score 0 Filed at: 12/09/2024 1220   JOCELYN: How many times in the past year have you...    Used an illegal drug or used a prescription medication for non-medical reasons? Never Filed at: 12/09/2024 1220                            History of Present Illness       Chief Complaint   Patient presents with    Back Pain     Friday patient was helping a student back into seat and tripped over students feet striking back off wall, pain in upper back that radiates into middle of the back        History reviewed. No  pertinent past medical history.   Past Surgical History:   Procedure Laterality Date    IR LUMBAR PUNCTURE  6/1/2022      Family History   Problem Relation Age of Onset    Arthritis Mother     Arthritis Father     Diabetes Father     Hypertension Father       Social History     Tobacco Use    Smoking status: Never    Smokeless tobacco: Never   Vaping Use    Vaping status: Never Used   Substance Use Topics    Alcohol use: Not Currently    Drug use: Not Currently      E-Cigarette/Vaping    E-Cigarette Use Never User       E-Cigarette/Vaping Substances    Nicotine No     THC No     CBD No     Flavoring No     Other No     Unknown No       I have reviewed and agree with the history as documented.     39-year-old female presenting to the emergency department with a chief complaint of upper back pain.  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      Back Pain  Associated symptoms: no abdominal pain, no chest pain, no dysuria, no fever and no headaches        Review of Systems   Constitutional:  Negative for diaphoresis, fatigue and fever.   HENT:  Negative for congestion, ear pain, nosebleeds and sore throat.    Eyes:  Negative for photophobia, pain, discharge and visual disturbance.   Respiratory:  Negative for cough, choking, chest tightness, shortness of breath and wheezing.    Cardiovascular:  Negative for chest pain and palpitations.   Gastrointestinal:  Negative for abdominal distention, abdominal pain, diarrhea and vomiting.   Genitourinary:  Negative for dysuria, flank pain and frequency.   Musculoskeletal:  Positive for back pain. Negative for gait problem and joint swelling.   Skin:  Negative for color change and rash.   Neurological:  Negative for dizziness, syncope and headaches.   Psychiatric/Behavioral:  Negative for behavioral problems and confusion. The  patient is not nervous/anxious.    All other systems reviewed and are negative.          Objective       ED Triage Vitals   Temperature Pulse Blood Pressure Respirations SpO2 Patient Position - Orthostatic VS   12/09/24 1218 12/09/24 1218 12/09/24 1218 12/09/24 1218 12/09/24 1218 --   97.5 °F (36.4 °C) 65 142/65 18 100 %       Temp Source Heart Rate Source BP Location FiO2 (%) Pain Score    12/09/24 1218 12/09/24 1218 12/09/24 1218 -- 12/09/24 1327    Temporal Monitor Left arm  5      Vitals      Date and Time Temp Pulse SpO2 Resp BP Pain Score FACES Pain Rating User   12/09/24 1327 -- -- -- -- -- 5 -- SL   12/09/24 1218 97.5 °F (36.4 °C) 65 100 % 18 142/65 -- -- MR            Physical Exam  Vitals and nursing note reviewed.   Constitutional:       General: She is not in acute distress.     Appearance: She is well-developed. She is not ill-appearing or toxic-appearing.   HENT:      Head: Normocephalic and atraumatic.      Nose: No rhinorrhea.      Mouth/Throat:      Mouth: Mucous membranes are moist.      Dentition: Normal dentition.   Eyes:      General:         Right eye: No discharge.         Left eye: No discharge.   Cardiovascular:      Rate and Rhythm: Normal rate and regular rhythm.   Pulmonary:      Effort: Pulmonary effort is normal. No accessory muscle usage or respiratory distress.   Abdominal:      General: There is no distension.      Tenderness: There is no guarding.   Musculoskeletal:         General: Normal range of motion.      Cervical back: Normal range of motion and neck supple. No rigidity.      Thoracic back: Spasms and tenderness present.        Back:    Skin:     General: Skin is warm and dry.   Neurological:      Mental Status: She is alert and oriented to person, place, and time.      Coordination: Coordination normal.   Psychiatric:         Behavior: Behavior is cooperative.         Results Reviewed       None            XR spine cervical 2 or 3 vw injury   Final Interpretation by Celestine  Klaus Thompson MD ([id number])      No acute osseous abnormality.         Workstation performed: [id number]         XR spine thoracic 3 vw   Final Interpretation by Celestine Thompson MD ([id number])      No acute osseous abnormality.         Computerized Assisted Algorithm (CAA) may have been used to analyze all applicable images.         Workstation performed: [id number]             Procedures    ED Medication and Procedure Management   Prior to Admission Medications   Prescriptions Last Dose Informant Patient Reported? Taking?   acetaZOLAMIDE (DIAMOX) 250 mg tablet   No No   Si TABLETS BY MOUTH TWICE A DAY   acetaZOLAMIDE (DIAMOX) 500 mg capsule   No No   Sig: TAKE 1 CAPSULE BY MOUTH TWICE A DAY   ferrous sulfate 325 (65 Fe) mg tablet   Yes No   Sig: Take 1 tablet by mouth daily with breakfast   sertraline (ZOLOFT) 50 mg tablet   Yes No   Sig: Take 50 mg by mouth daily      Facility-Administered Medications: None     Discharge Medication List as of 2024  2:52 PM        START taking these medications    Details   cyclobenzaprine (FLEXERIL) 10 mg tablet Take 1 tablet (10 mg total) by mouth 3 (three) times a day as needed for muscle spasms for up to 7 days, Starting Mon 2024, Until Mon 2024 at 2359, Normal      HYDROcodone-acetaminophen (NORCO) 5-325 mg per tablet Take 1 tablet by mouth every 6 (six) hours as needed for pain for up to 12 doses Max Daily Amount: 4 tablets, Starting Mon 2024, Normal      ibuprofen (MOTRIN) 600 mg tablet Take 1 tablet (600 mg total) by mouth every 6 (six) hours as needed for mild pain for up to 5 days, Starting Mon 2024, Until Sat 2024 at 2359, Normal           CONTINUE these medications which have NOT CHANGED    Details   acetaZOLAMIDE (DIAMOX) 250 mg tablet 2 TABLETS BY MOUTH TWICE A DAY, Normal      acetaZOLAMIDE (DIAMOX) 500 mg capsule TAKE 1 CAPSULE BY MOUTH TWICE A DAY, Normal      ferrous sulfate 325 (65 Fe) mg tablet Take 1  tablet by mouth daily with breakfast, Starting Mon 6/13/2022, Historical Med      sertraline (ZOLOFT) 50 mg tablet Take 50 mg by mouth daily, Historical Med           No discharge procedures on file.  ED SEPSIS DOCUMENTATION   Time reflects when diagnosis was documented in both MDM as applicable and the Disposition within this note       Time User Action Codes Description Comment    12/9/2024  2:50 PM Juan Francisco Candelario Add [S29.9XXA] Injury of upper back, initial encounter                  ZINA Cortez  12/09/24 4718